# Patient Record
Sex: MALE | Race: WHITE | Employment: UNEMPLOYED | URBAN - METROPOLITAN AREA
[De-identification: names, ages, dates, MRNs, and addresses within clinical notes are randomized per-mention and may not be internally consistent; named-entity substitution may affect disease eponyms.]

---

## 2017-06-29 ENCOUNTER — OFFICE VISIT (OUTPATIENT)
Dept: FAMILY MEDICINE CLINIC | Age: 42
End: 2017-06-29

## 2017-06-29 VITALS
HEIGHT: 60 IN | BODY MASS INDEX: 28.35 KG/M2 | DIASTOLIC BLOOD PRESSURE: 80 MMHG | HEART RATE: 88 BPM | SYSTOLIC BLOOD PRESSURE: 120 MMHG | WEIGHT: 144.4 LBS

## 2017-06-29 DIAGNOSIS — F72 SEVERE INTELLECTUAL DISABILITY: Primary | ICD-10-CM

## 2017-06-29 DIAGNOSIS — F63.9 IMPULSE CONTROL DISORDER IN ADULT: ICD-10-CM

## 2017-06-29 DIAGNOSIS — R19.6 HALITOSIS: ICD-10-CM

## 2017-06-29 PROCEDURE — 1036F TOBACCO NON-USER: CPT | Performed by: FAMILY MEDICINE

## 2017-06-29 PROCEDURE — G8427 DOCREV CUR MEDS BY ELIG CLIN: HCPCS | Performed by: FAMILY MEDICINE

## 2017-06-29 PROCEDURE — 99213 OFFICE O/P EST LOW 20 MIN: CPT | Performed by: FAMILY MEDICINE

## 2017-06-29 PROCEDURE — G8419 CALC BMI OUT NRM PARAM NOF/U: HCPCS | Performed by: FAMILY MEDICINE

## 2017-12-19 ENCOUNTER — OFFICE VISIT (OUTPATIENT)
Dept: FAMILY MEDICINE CLINIC | Age: 42
End: 2017-12-19

## 2017-12-19 VITALS — DIASTOLIC BLOOD PRESSURE: 78 MMHG | SYSTOLIC BLOOD PRESSURE: 120 MMHG | TEMPERATURE: 97 F | RESPIRATION RATE: 12 BRPM

## 2017-12-19 DIAGNOSIS — R19.6 HALITOSIS: ICD-10-CM

## 2017-12-19 DIAGNOSIS — K06.9 CHRONIC GUM DISEASE: ICD-10-CM

## 2017-12-19 DIAGNOSIS — K12.2 INFECTION OF MOUTH: Primary | ICD-10-CM

## 2017-12-19 PROCEDURE — 99213 OFFICE O/P EST LOW 20 MIN: CPT | Performed by: FAMILY MEDICINE

## 2017-12-19 PROCEDURE — G8484 FLU IMMUNIZE NO ADMIN: HCPCS | Performed by: FAMILY MEDICINE

## 2017-12-19 PROCEDURE — G8427 DOCREV CUR MEDS BY ELIG CLIN: HCPCS | Performed by: FAMILY MEDICINE

## 2017-12-19 PROCEDURE — G8417 CALC BMI ABV UP PARAM F/U: HCPCS | Performed by: FAMILY MEDICINE

## 2017-12-19 PROCEDURE — 1036F TOBACCO NON-USER: CPT | Performed by: FAMILY MEDICINE

## 2017-12-19 PROCEDURE — 96372 THER/PROPH/DIAG INJ SC/IM: CPT | Performed by: FAMILY MEDICINE

## 2017-12-19 RX ORDER — DOXYCYCLINE HYCLATE 100 MG
100 TABLET ORAL 2 TIMES DAILY
Qty: 20 TABLET | Refills: 0 | Status: SHIPPED | OUTPATIENT
Start: 2017-12-19 | End: 2018-08-01 | Stop reason: SDUPTHER

## 2017-12-19 RX ORDER — CEFTRIAXONE 500 MG/1
500 INJECTION, POWDER, FOR SOLUTION INTRAMUSCULAR; INTRAVENOUS ONCE
Status: COMPLETED | OUTPATIENT
Start: 2017-12-19 | End: 2017-12-19

## 2017-12-19 RX ADMIN — CEFTRIAXONE 500 MG: 500 INJECTION, POWDER, FOR SOLUTION INTRAMUSCULAR; INTRAVENOUS at 14:50

## 2017-12-21 ENCOUNTER — OFFICE VISIT (OUTPATIENT)
Dept: FAMILY MEDICINE CLINIC | Age: 42
End: 2017-12-21

## 2017-12-21 VITALS — HEIGHT: 60 IN | DIASTOLIC BLOOD PRESSURE: 64 MMHG | SYSTOLIC BLOOD PRESSURE: 110 MMHG

## 2017-12-21 DIAGNOSIS — R32 URINARY INCONTINENCE, UNSPECIFIED TYPE: ICD-10-CM

## 2017-12-21 DIAGNOSIS — K59.00 CONSTIPATION, UNSPECIFIED CONSTIPATION TYPE: ICD-10-CM

## 2017-12-21 DIAGNOSIS — R15.9 INCONTINENCE OF FECES, UNSPECIFIED FECAL INCONTINENCE TYPE: ICD-10-CM

## 2017-12-21 DIAGNOSIS — F72 SEVERE INTELLECTUAL DISABILITY: ICD-10-CM

## 2017-12-21 DIAGNOSIS — L21.9 SEBORRHEA: ICD-10-CM

## 2017-12-21 DIAGNOSIS — K12.2 INFECTION OF MOUTH: ICD-10-CM

## 2017-12-21 DIAGNOSIS — E55.9 VITAMIN D DEFICIENCY: ICD-10-CM

## 2017-12-21 PROCEDURE — G8484 FLU IMMUNIZE NO ADMIN: HCPCS | Performed by: FAMILY MEDICINE

## 2017-12-21 PROCEDURE — G8428 CUR MEDS NOT DOCUMENT: HCPCS | Performed by: FAMILY MEDICINE

## 2017-12-21 PROCEDURE — 1036F TOBACCO NON-USER: CPT | Performed by: FAMILY MEDICINE

## 2017-12-21 PROCEDURE — G8417 CALC BMI ABV UP PARAM F/U: HCPCS | Performed by: FAMILY MEDICINE

## 2017-12-21 PROCEDURE — 99213 OFFICE O/P EST LOW 20 MIN: CPT | Performed by: FAMILY MEDICINE

## 2017-12-21 RX ORDER — INCONTINENCE PAD,LINER,DISP
EACH MISCELLANEOUS
Qty: 1 PACKAGE | Refills: 11 | Status: SHIPPED | OUTPATIENT
Start: 2017-12-21 | End: 2018-12-13 | Stop reason: SDUPTHER

## 2017-12-21 RX ORDER — DOCUSATE SODIUM 100 MG/1
100 CAPSULE, LIQUID FILLED ORAL NIGHTLY
Qty: 30 CAPSULE | Refills: 11 | Status: SHIPPED | OUTPATIENT
Start: 2017-12-21 | End: 2018-12-13 | Stop reason: SDUPTHER

## 2017-12-21 RX ORDER — SENNA PLUS 8.6 MG/1
2 TABLET ORAL DAILY
Qty: 60 TABLET | Refills: 11 | Status: SHIPPED | OUTPATIENT
Start: 2017-12-21 | End: 2018-12-13 | Stop reason: SDUPTHER

## 2017-12-21 RX ORDER — MULTIPLE VITAMINS W/ MINERALS TAB 9MG-400MCG
1 TAB ORAL DAILY
Qty: 30 TABLET | Refills: 11 | Status: SHIPPED | OUTPATIENT
Start: 2017-12-21 | End: 2019-09-20 | Stop reason: CLARIF

## 2017-12-21 RX ORDER — CHOLECALCIFEROL (VITAMIN D3) 50 MCG
2000 TABLET ORAL DAILY
Qty: 30 TABLET | Refills: 11 | Status: SHIPPED | OUTPATIENT
Start: 2017-12-21 | End: 2018-12-13 | Stop reason: SDUPTHER

## 2017-12-21 NOTE — LETTER
[address]  Phone: [phone]  Fax: [phone]    Rina Feliciano MD        December 21, 2017     Patient: Ashwin Hanson   YOB: 1975   Date of Visit: 12/21/2017       To Whom It May Concern: It is my medical opinion that Mara Navarrete may return to work on 12/22/2017. If you have any questions or concerns, please don't hesitate to call.     Sincerely,        Rina Feliciano MD

## 2018-08-01 ENCOUNTER — OFFICE VISIT (OUTPATIENT)
Dept: FAMILY MEDICINE CLINIC | Age: 43
End: 2018-08-01

## 2018-08-01 VITALS — SYSTOLIC BLOOD PRESSURE: 118 MMHG | TEMPERATURE: 98 F | DIASTOLIC BLOOD PRESSURE: 60 MMHG

## 2018-08-01 DIAGNOSIS — K06.8 BLEEDING GUMS: Primary | ICD-10-CM

## 2018-08-01 PROCEDURE — 1036F TOBACCO NON-USER: CPT | Performed by: FAMILY MEDICINE

## 2018-08-01 PROCEDURE — G8427 DOCREV CUR MEDS BY ELIG CLIN: HCPCS | Performed by: FAMILY MEDICINE

## 2018-08-01 PROCEDURE — 99213 OFFICE O/P EST LOW 20 MIN: CPT | Performed by: FAMILY MEDICINE

## 2018-08-01 PROCEDURE — G8421 BMI NOT CALCULATED: HCPCS | Performed by: FAMILY MEDICINE

## 2018-08-01 RX ORDER — BUSPIRONE HYDROCHLORIDE 15 MG/1
15 TABLET ORAL 2 TIMES DAILY
COMMUNITY

## 2018-08-01 RX ORDER — DOXYCYCLINE HYCLATE 100 MG
100 TABLET ORAL 2 TIMES DAILY
Qty: 14 TABLET | Refills: 0 | Status: SHIPPED | OUTPATIENT
Start: 2018-08-01 | End: 2018-08-08

## 2018-08-01 NOTE — LETTER
[address]  Phone: [phone]  Fax: [phone]    Susan Lawrence MD        August 1, 2018     Patient: Edgar Montanez   YOB: 1975   Date of Visit: 8/1/2018       To Whom it May Concern:    Anel Magana was seen in my clinic on 8/1/2018. He may return to work on 8/1/2018 without restrictions. .    If you have any questions or concerns, please don't hesitate to call.     Sincerely,         Susan Lawrence MD

## 2018-10-10 ENCOUNTER — HOSPITAL ENCOUNTER (EMERGENCY)
Age: 43
Discharge: HOME | End: 2018-10-10
Attending: EMERGENCY MEDICINE
Payer: MEDICARE

## 2018-10-10 VITALS
TEMPERATURE: 98.1 F | SYSTOLIC BLOOD PRESSURE: 122 MMHG | DIASTOLIC BLOOD PRESSURE: 93 MMHG | WEIGHT: 145 LBS | HEART RATE: 82 BPM | RESPIRATION RATE: 16 BRPM | HEIGHT: 62 IN | BODY MASS INDEX: 26.68 KG/M2 | OXYGEN SATURATION: 96 %

## 2018-10-10 DIAGNOSIS — R19.5 ABNORMAL STOOL COLOR: Primary | ICD-10-CM

## 2018-10-10 LAB
ALBUMIN SERPL-MCNC: 4 GM/DL (ref 3.4–5)
ALP BLD-CCNC: 64 IU/L (ref 40–129)
ALT SERPL-CCNC: 27 U/L (ref 10–40)
ANION GAP SERPL CALCULATED.3IONS-SCNC: 9 MMOL/L (ref 4–16)
AST SERPL-CCNC: 36 IU/L (ref 15–37)
BASOPHILS ABSOLUTE: 0 K/CU MM
BASOPHILS RELATIVE PERCENT: 0.5 % (ref 0–1)
BILIRUB SERPL-MCNC: 0.1 MG/DL (ref 0–1)
BUN BLDV-MCNC: 17 MG/DL (ref 6–23)
CALCIUM SERPL-MCNC: 9.4 MG/DL (ref 8.3–10.6)
CHLORIDE BLD-SCNC: 102 MMOL/L (ref 99–110)
CO2: 26 MMOL/L (ref 21–32)
CREAT SERPL-MCNC: 1.1 MG/DL (ref 0.9–1.3)
DIFFERENTIAL TYPE: ABNORMAL
EOSINOPHILS ABSOLUTE: 0.2 K/CU MM
EOSINOPHILS RELATIVE PERCENT: 3.2 % (ref 0–3)
GFR AFRICAN AMERICAN: >60 ML/MIN/1.73M2
GFR NON-AFRICAN AMERICAN: >60 ML/MIN/1.73M2
GLUCOSE BLD-MCNC: 103 MG/DL (ref 70–99)
HCT VFR BLD CALC: 42.2 % (ref 42–52)
HEMOGLOBIN: 13.9 GM/DL (ref 13.5–18)
IMMATURE NEUTROPHIL %: 0.2 % (ref 0–0.43)
INR BLD: 1.08 INDEX
LYMPHOCYTES ABSOLUTE: 2.3 K/CU MM
LYMPHOCYTES RELATIVE PERCENT: 40.5 % (ref 24–44)
MCH RBC QN AUTO: 31.8 PG (ref 27–31)
MCHC RBC AUTO-ENTMCNC: 32.9 % (ref 32–36)
MCV RBC AUTO: 96.6 FL (ref 78–100)
MONOCYTES ABSOLUTE: 0.5 K/CU MM
MONOCYTES RELATIVE PERCENT: 9.1 % (ref 0–4)
NUCLEATED RBC %: 0 %
PDW BLD-RTO: 12.6 % (ref 11.7–14.9)
PLATELET # BLD: 172 K/CU MM (ref 140–440)
PMV BLD AUTO: 10.2 FL (ref 7.5–11.1)
POTASSIUM SERPL-SCNC: 4.2 MMOL/L (ref 3.5–5.1)
PROTHROMBIN TIME: 12.3 SECONDS (ref 9.12–12.5)
RBC # BLD: 4.37 M/CU MM (ref 4.6–6.2)
SEGMENTED NEUTROPHILS ABSOLUTE COUNT: 2.6 K/CU MM
SEGMENTED NEUTROPHILS RELATIVE PERCENT: 46.5 % (ref 36–66)
SODIUM BLD-SCNC: 137 MMOL/L (ref 135–145)
TOTAL IMMATURE NEUTOROPHIL: 0.01 K/CU MM
TOTAL NUCLEATED RBC: 0 K/CU MM
TOTAL PROTEIN: 7.2 GM/DL (ref 6.4–8.2)
WBC # BLD: 5.6 K/CU MM (ref 4–10.5)

## 2018-10-10 PROCEDURE — 85025 COMPLETE CBC W/AUTO DIFF WBC: CPT

## 2018-10-10 PROCEDURE — 85610 PROTHROMBIN TIME: CPT

## 2018-10-10 PROCEDURE — 80053 COMPREHEN METABOLIC PANEL: CPT

## 2018-10-10 PROCEDURE — 99283 EMERGENCY DEPT VISIT LOW MDM: CPT

## 2018-10-10 NOTE — ED NOTES
Discharge instructions reviewed. Pt caregiver verbalized understanding.        [facility], RN  10/10/18 6921

## 2018-12-13 ENCOUNTER — OFFICE VISIT (OUTPATIENT)
Dept: FAMILY MEDICINE CLINIC | Age: 43
End: 2018-12-13
Payer: MEDICARE

## 2018-12-13 VITALS — BODY MASS INDEX: 27.44 KG/M2 | DIASTOLIC BLOOD PRESSURE: 80 MMHG | WEIGHT: 150 LBS | SYSTOLIC BLOOD PRESSURE: 110 MMHG

## 2018-12-13 DIAGNOSIS — K59.00 CONSTIPATION, UNSPECIFIED CONSTIPATION TYPE: ICD-10-CM

## 2018-12-13 DIAGNOSIS — L22 DIAPER RASH: Primary | ICD-10-CM

## 2018-12-13 DIAGNOSIS — Z23 NEED FOR TETANUS BOOSTER: ICD-10-CM

## 2018-12-13 DIAGNOSIS — R32 URINARY INCONTINENCE, UNSPECIFIED TYPE: ICD-10-CM

## 2018-12-13 DIAGNOSIS — R15.9 INCONTINENCE OF FECES, UNSPECIFIED FECAL INCONTINENCE TYPE: ICD-10-CM

## 2018-12-13 DIAGNOSIS — L21.9 SEBORRHEA: ICD-10-CM

## 2018-12-13 DIAGNOSIS — F72 SEVERE INTELLECTUAL DISABILITY: ICD-10-CM

## 2018-12-13 DIAGNOSIS — E55.9 VITAMIN D DEFICIENCY: ICD-10-CM

## 2018-12-13 PROCEDURE — G8417 CALC BMI ABV UP PARAM F/U: HCPCS | Performed by: FAMILY MEDICINE

## 2018-12-13 PROCEDURE — G8427 DOCREV CUR MEDS BY ELIG CLIN: HCPCS | Performed by: FAMILY MEDICINE

## 2018-12-13 PROCEDURE — G8484 FLU IMMUNIZE NO ADMIN: HCPCS | Performed by: FAMILY MEDICINE

## 2018-12-13 PROCEDURE — 99214 OFFICE O/P EST MOD 30 MIN: CPT | Performed by: FAMILY MEDICINE

## 2018-12-13 PROCEDURE — 90471 IMMUNIZATION ADMIN: CPT | Performed by: FAMILY MEDICINE

## 2018-12-13 PROCEDURE — 90715 TDAP VACCINE 7 YRS/> IM: CPT | Performed by: FAMILY MEDICINE

## 2018-12-13 PROCEDURE — 1036F TOBACCO NON-USER: CPT | Performed by: FAMILY MEDICINE

## 2018-12-13 RX ORDER — SENNA PLUS 8.6 MG/1
2 TABLET ORAL DAILY
Qty: 60 TABLET | Refills: 11 | Status: SHIPPED | OUTPATIENT
Start: 2018-12-13 | End: 2019-12-16 | Stop reason: SDUPTHER

## 2018-12-13 RX ORDER — DOCUSATE SODIUM 100 MG/1
100 CAPSULE, LIQUID FILLED ORAL NIGHTLY
Qty: 30 CAPSULE | Refills: 11 | Status: SHIPPED | OUTPATIENT
Start: 2018-12-13 | End: 2019-12-16 | Stop reason: SDUPTHER

## 2018-12-13 RX ORDER — CHOLECALCIFEROL (VITAMIN D3) 50 MCG
2000 TABLET ORAL DAILY
Qty: 30 TABLET | Refills: 11 | Status: SHIPPED | OUTPATIENT
Start: 2018-12-13 | End: 2019-09-23

## 2018-12-13 RX ORDER — INCONTINENCE PAD,LINER,DISP
EACH MISCELLANEOUS
Qty: 1 PACKAGE | Refills: 11 | Status: SHIPPED | OUTPATIENT
Start: 2018-12-13 | End: 2019-12-16 | Stop reason: SDUPTHER

## 2018-12-13 RX ORDER — NYSTATIN 100000 [USP'U]/G
POWDER TOPICAL
Qty: 1 BOTTLE | Refills: 11 | Status: SHIPPED | OUTPATIENT
Start: 2018-12-13 | End: 2019-12-16 | Stop reason: SDUPTHER

## 2019-09-04 ENCOUNTER — CARE COORDINATION (OUTPATIENT)
Dept: CARE COORDINATION | Age: 44
End: 2019-09-04

## 2019-09-17 ENCOUNTER — CARE COORDINATION (OUTPATIENT)
Dept: CARE COORDINATION | Age: 44
End: 2019-09-17

## 2019-09-20 ENCOUNTER — OFFICE VISIT (OUTPATIENT)
Dept: FAMILY MEDICINE CLINIC | Age: 44
End: 2019-09-20
Payer: MEDICARE

## 2019-09-20 VITALS
RESPIRATION RATE: 22 BRPM | HEIGHT: 60 IN | DIASTOLIC BLOOD PRESSURE: 81 MMHG | BODY MASS INDEX: 29.21 KG/M2 | WEIGHT: 148.8 LBS | SYSTOLIC BLOOD PRESSURE: 125 MMHG | HEART RATE: 62 BPM

## 2019-09-20 DIAGNOSIS — R19.6 BAD BREATH: ICD-10-CM

## 2019-09-20 DIAGNOSIS — K08.9 POOR DENTITION: ICD-10-CM

## 2019-09-20 DIAGNOSIS — Z01.818 PRE-OP EXAMINATION: Primary | ICD-10-CM

## 2019-09-20 DIAGNOSIS — F72 SEVERE INTELLECTUAL DISABILITY: ICD-10-CM

## 2019-09-20 PROCEDURE — G8427 DOCREV CUR MEDS BY ELIG CLIN: HCPCS | Performed by: FAMILY MEDICINE

## 2019-09-20 PROCEDURE — G8417 CALC BMI ABV UP PARAM F/U: HCPCS | Performed by: FAMILY MEDICINE

## 2019-09-20 PROCEDURE — 99212 OFFICE O/P EST SF 10 MIN: CPT | Performed by: FAMILY MEDICINE

## 2019-09-20 RX ORDER — MULTIVIT-MIN/IRON/FOLIC ACID/K 18-600-40
CAPSULE ORAL
COMMUNITY
End: 2021-12-06

## 2019-09-20 ASSESSMENT — PATIENT HEALTH QUESTIONNAIRE - PHQ9
2. FEELING DOWN, DEPRESSED OR HOPELESS: 0
1. LITTLE INTEREST OR PLEASURE IN DOING THINGS: 0
DEPRESSION UNABLE TO ASSESS: FUNCTIONAL CAPACITY MOTIVATION LIMITS ACCURACY
SUM OF ALL RESPONSES TO PHQ QUESTIONS 1-9: 0
SUM OF ALL RESPONSES TO PHQ QUESTIONS 1-9: 0
SUM OF ALL RESPONSES TO PHQ9 QUESTIONS 1 & 2: 0

## 2019-09-20 NOTE — PROGRESS NOTES
Que Kilpatrick  1975 09/20/19    HPI:    Here for preop clearance for dental cleaning. has had previous surgery. has not had past problemswith anesthesia. has not had unusual bleeding or bruising. has not had previous surgical infection. Knows to stop NSAIDS and smoking (if applicable) at least 1 week priorto surgery. Unable to do review of systems due to severe intellectual disability. Patient Active Problem List   Diagnosis    Vitamin D deficiency    Severe intellectual disability    Constipation    Neuropathy    Urinary incontinence    Impulse control disorder in adult    Seborrhea    Halitosis    Charcot's syndrome ([address]    Chronic gum disease    Stool incontinence       Past Medical History:   Diagnosis Date    Constipation     Dandruff in adult     H/O mental retardation     pt is \"only verbal alittle bit- able to say yes but not always correct\",ambulatory, feeds and dresses self with assist    HX OTHER MEDICAL     old chart gives hx of Charciots Syndrome with associated neuropathy    Hyperlipidemia     Impulse disorder     Periodontal disease     Urinary incontinence     wears depends( urine and bowel incont)         Past Surgical History:   Procedure Laterality Date    DENTAL SURGERY      dental restoration on 3/2010 and 3/2011   [facility]  9/13/2013    Dental restorations, 2 teeth extractions    DENTAL SURGERY Bilateral [id number]    restorations       Current Outpatient Medications on File Prior to Visit   Medication Sig Dispense Refill    Cholecalciferol (VITAMIN D) 2000 units CAPS capsule Take by mouth      senna (SENOKOT) 8.6 MG tablet Take 2 tablets by mouth daily 60 tablet 11    Disposable Gloves (NITRILE GLOVES LARGE) MISC Use as directed 100 each 11    coal tar (MAYITO-GEL TAR SHAMPOO) 0.5 % shampoo Apply topically every other day at bath time.  1 Bottle 11    Incontinence Supply Disposable (DEPEND UNDERGARMENT EX ABSORB) MISC Use as needed for

## 2019-09-26 ENCOUNTER — ANESTHESIA EVENT (OUTPATIENT)
Dept: OPERATING ROOM | Age: 44
End: 2019-09-26
Payer: MEDICARE

## 2019-09-26 ASSESSMENT — LIFESTYLE VARIABLES: SMOKING_STATUS: 0

## 2019-09-26 NOTE — ANESTHESIA PRE PROCEDURE
incontinence-Medium size 1 Package 11    docusate sodium (COLACE) 100 MG capsule Take 1 capsule by mouth nightly 30 capsule 11    nystatin (MYCOSTATIN) 780083 UNIT/GM powder 2 x day for 2 weeks per episode 1 Bottle 11    busPIRone (BUSPAR) 15 MG tablet Take 15 mg by mouth 2 times daily      citalopram (CELEXA) 40 MG tablet Take 1 tablet by mouth daily 30 tablet 5       Allergies: Allergies   Allergen Reactions    Other      Per provider- allergic to Parish-cobefrin- they are unsure of the reaction with this       Problem List:    Patient Active Problem List   Diagnosis Code    Vitamin D deficiency E55.9    Severe intellectual disability F72    Constipation K59.00    Neuropathy G62.9    Urinary incontinence R32    Impulse control disorder in adult F63.9    Seborrhea L21.9    Halitosis R19.6    Charcot's syndrome ([address] I73.9    Chronic gum disease K06.9    Stool incontinence R15.9       Past Medical History:        Diagnosis Date    Constipation     Dandruff in adult     H/O mental retardation      per caregiver \" he is pretty much non-verbal just makes some noises\",ambulatory, feeds and dresses self with assist\"    HX OTHER MEDICAL     old chart gives hx of Charciots Syndrome with associated neuropathy    Hyperlipidemia     Impulse disorder     Periodontal disease     Urinary incontinence     wears depends( urine and bowel incont)       Past Surgical History:        Procedure Laterality Date    DENTAL SURGERY      dental restoration on 3/2010 and 3/2011   [facility] DENTAL SURGERY  9/13/2013    Dental restorations, 2 teeth extractions    DENTAL SURGERY Bilateral [id number]    restorations       Social History:    Social History     Tobacco Use    Smoking status: Never Smoker    Smokeless tobacco: Never Used    Tobacco comment: provider has no family hx   Substance Use Topics    Alcohol use:  No                                Counseling given: Not Answered  Comment: provider has no family hx      Vital Signs (Current):   Vitals:    09/23/19 1324   Weight: 149 lb (67.6 kg)   Height: 5' (1.524 m)                                              BP Readings from Last 3 Encounters:   09/20/19 125/81   12/13/18 110/80   10/10/18 (!) 122/93       NPO Status:                                                                                 BMI:   Wt Readings from Last 3 Encounters:   09/20/19 148 lb 12.8 oz (67.5 kg)   12/13/18 150 lb (68 kg)   10/10/18 145 lb (65.8 kg)     Body mass index is 29.1 kg/m². CBC:   Lab Results   Component Value Date    WBC 5.6 10/10/2018    RBC 4.37 10/10/2018    HGB 13.9 10/10/2018    HCT 42.2 10/10/2018    MCV 96.6 10/10/2018    RDW 12.6 10/10/2018     10/10/2018       CMP:   Lab Results   Component Value Date     10/10/2018    K 4.2 10/10/2018     10/10/2018    CO2 26 10/10/2018    BUN 17 10/10/2018    CREATININE 1.1 10/10/2018    GFRAA >60 10/10/2018    AGRATIO 1.6 06/13/2016    LABGLOM >60 10/10/2018    GLUCOSE 103 10/10/2018    PROT 7.2 10/10/2018    PROT 7.2 03/24/2011    CALCIUM 9.4 10/10/2018    BILITOT 0.1 10/10/2018    ALKPHOS 64 10/10/2018    AST 36 10/10/2018    ALT 27 10/10/2018       POC Tests: No results for input(s): POCGLU, POCNA, POCK, POCCL, POCBUN, POCHEMO, POCHCT in the last 72 hours.     Coags:   Lab Results   Component Value Date    PROTIME 12.3 10/10/2018    INR 1.08 10/10/2018       HCG (If Applicable): No results found for: PREGTESTUR, PREGSERUM, HCG, HCGQUANT     ABGs: No results found for: PHART, PO2ART, OKU6GUS, JHO2PTU, BEART, L1TTMJGM     Type & Screen (If Applicable):  No results found for: LABABO, [address]    Anesthesia Evaluation  Patient summary reviewed and Nursing notes reviewed  Airway: Mallampati: III  TM distance: <3 FB   Neck ROM: limited  Mouth opening: > = 3 FB Dental:      Comment: NONVERBAL , MRDD    Pulmonary:Negative Pulmonary ROS and normal exam        (-) not a current smoker

## 2019-09-27 ENCOUNTER — ANESTHESIA (OUTPATIENT)
Dept: OPERATING ROOM | Age: 44
End: 2019-09-27
Payer: MEDICARE

## 2019-09-27 ENCOUNTER — HOSPITAL ENCOUNTER (OUTPATIENT)
Age: 44
Setting detail: OUTPATIENT SURGERY
Discharge: HOME | End: 2019-09-27
Attending: DENTIST | Admitting: DENTIST
Payer: MEDICARE

## 2019-09-27 VITALS
OXYGEN SATURATION: 95 % | TEMPERATURE: 98.6 F | DIASTOLIC BLOOD PRESSURE: 75 MMHG | RESPIRATION RATE: 12 BRPM | SYSTOLIC BLOOD PRESSURE: 116 MMHG

## 2019-09-27 VITALS
DIASTOLIC BLOOD PRESSURE: 72 MMHG | HEART RATE: 76 BPM | WEIGHT: 149 LBS | SYSTOLIC BLOOD PRESSURE: 103 MMHG | TEMPERATURE: 97.8 F | BODY MASS INDEX: 29.25 KG/M2 | OXYGEN SATURATION: 99 % | RESPIRATION RATE: 16 BRPM | HEIGHT: 60 IN

## 2019-09-27 PROBLEM — K08.129: Status: RESOLVED | Noted: 2019-09-27 | Resolved: 2019-09-27

## 2019-09-27 LAB
ANION GAP SERPL CALCULATED.3IONS-SCNC: 6 MMOL/L (ref 4–16)
BUN BLDV-MCNC: 17 MG/DL (ref 6–23)
CALCIUM SERPL-MCNC: 9 MG/DL (ref 8.3–10.6)
CHLORIDE BLD-SCNC: 103 MMOL/L (ref 99–110)
CO2: 30 MMOL/L (ref 21–32)
CREAT SERPL-MCNC: 1 MG/DL (ref 0.9–1.3)
GFR AFRICAN AMERICAN: >60 ML/MIN/1.73M2
GFR NON-AFRICAN AMERICAN: >60 ML/MIN/1.73M2
GLUCOSE BLD-MCNC: 89 MG/DL (ref 70–99)
HCT VFR BLD CALC: 44.4 % (ref 42–52)
HEMOGLOBIN: 14.4 GM/DL (ref 13.5–18)
MCH RBC QN AUTO: 30.8 PG (ref 27–31)
MCHC RBC AUTO-ENTMCNC: 32.4 % (ref 32–36)
MCV RBC AUTO: 94.9 FL (ref 78–100)
PDW BLD-RTO: 12.6 % (ref 11.7–14.9)
PLATELET # BLD: 174 K/CU MM (ref 140–440)
PMV BLD AUTO: 10.5 FL (ref 7.5–11.1)
POTASSIUM SERPL-SCNC: 4.5 MMOL/L (ref 3.5–5.1)
RBC # BLD: 4.68 M/CU MM (ref 4.6–6.2)
SODIUM BLD-SCNC: 139 MMOL/L (ref 135–145)
WBC # BLD: 5.1 K/CU MM (ref 4–10.5)

## 2019-09-27 PROCEDURE — 7100000000 HC PACU RECOVERY - FIRST 15 MIN: Performed by: DENTIST

## 2019-09-27 PROCEDURE — 2500000003 HC RX 250 WO HCPCS: Performed by: NURSE ANESTHETIST, CERTIFIED REGISTERED

## 2019-09-27 PROCEDURE — 7100000001 HC PACU RECOVERY - ADDTL 15 MIN: Performed by: DENTIST

## 2019-09-27 PROCEDURE — 85027 COMPLETE CBC AUTOMATED: CPT

## 2019-09-27 PROCEDURE — 6360000002 HC RX W HCPCS: Performed by: NURSE ANESTHETIST, CERTIFIED REGISTERED

## 2019-09-27 PROCEDURE — 7100000011 HC PHASE II RECOVERY - ADDTL 15 MIN: Performed by: DENTIST

## 2019-09-27 PROCEDURE — 2709999900 HC NON-CHARGEABLE SUPPLY: Performed by: DENTIST

## 2019-09-27 PROCEDURE — 3600000002 HC SURGERY LEVEL 2 BASE: Performed by: DENTIST

## 2019-09-27 PROCEDURE — 3700000000 HC ANESTHESIA ATTENDED CARE: Performed by: DENTIST

## 2019-09-27 PROCEDURE — 7100000010 HC PHASE II RECOVERY - FIRST 15 MIN: Performed by: DENTIST

## 2019-09-27 PROCEDURE — 3700000001 HC ADD 15 MINUTES (ANESTHESIA): Performed by: DENTIST

## 2019-09-27 PROCEDURE — 3600000012 HC SURGERY LEVEL 2 ADDTL 15MIN: Performed by: DENTIST

## 2019-09-27 PROCEDURE — 2580000003 HC RX 258: Performed by: NURSE ANESTHETIST, CERTIFIED REGISTERED

## 2019-09-27 PROCEDURE — 2580000003 HC RX 258

## 2019-09-27 PROCEDURE — 80048 BASIC METABOLIC PNL TOTAL CA: CPT

## 2019-09-27 RX ORDER — HYDRALAZINE HYDROCHLORIDE 20 MG/ML
5 INJECTION INTRAMUSCULAR; INTRAVENOUS EVERY 10 MIN PRN
Status: DISCONTINUED | OUTPATIENT
Start: 2019-09-27 | End: 2019-09-27 | Stop reason: HOSPADM

## 2019-09-27 RX ORDER — FENTANYL CITRATE 50 UG/ML
25 INJECTION, SOLUTION INTRAMUSCULAR; INTRAVENOUS EVERY 5 MIN PRN
Status: DISCONTINUED | OUTPATIENT
Start: 2019-09-27 | End: 2019-09-27 | Stop reason: HOSPADM

## 2019-09-27 RX ORDER — PROPOFOL 10 MG/ML
INJECTION, EMULSION INTRAVENOUS PRN
Status: DISCONTINUED | OUTPATIENT
Start: 2019-09-27 | End: 2019-09-27 | Stop reason: SDUPTHER

## 2019-09-27 RX ORDER — SODIUM CHLORIDE, SODIUM LACTATE, POTASSIUM CHLORIDE, CALCIUM CHLORIDE 600; 310; 30; 20 MG/100ML; MG/100ML; MG/100ML; MG/100ML
INJECTION, SOLUTION INTRAVENOUS CONTINUOUS PRN
Status: DISCONTINUED | OUTPATIENT
Start: 2019-09-27 | End: 2019-09-27 | Stop reason: SDUPTHER

## 2019-09-27 RX ORDER — SODIUM CHLORIDE, SODIUM LACTATE, POTASSIUM CHLORIDE, CALCIUM CHLORIDE 600; 310; 30; 20 MG/100ML; MG/100ML; MG/100ML; MG/100ML
INJECTION, SOLUTION INTRAVENOUS
Status: COMPLETED
Start: 2019-09-27 | End: 2019-09-27

## 2019-09-27 RX ORDER — LIDOCAINE HYDROCHLORIDE 20 MG/ML
INJECTION, SOLUTION INTRAVENOUS PRN
Status: DISCONTINUED | OUTPATIENT
Start: 2019-09-27 | End: 2019-09-27 | Stop reason: SDUPTHER

## 2019-09-27 RX ORDER — FENTANYL CITRATE 50 UG/ML
50 INJECTION, SOLUTION INTRAMUSCULAR; INTRAVENOUS EVERY 5 MIN PRN
Status: DISCONTINUED | OUTPATIENT
Start: 2019-09-27 | End: 2019-09-27 | Stop reason: HOSPADM

## 2019-09-27 RX ORDER — MIDAZOLAM HYDROCHLORIDE 1 MG/ML
INJECTION INTRAMUSCULAR; INTRAVENOUS PRN
Status: DISCONTINUED | OUTPATIENT
Start: 2019-09-27 | End: 2019-09-27 | Stop reason: SDUPTHER

## 2019-09-27 RX ORDER — ONDANSETRON 2 MG/ML
INJECTION INTRAMUSCULAR; INTRAVENOUS PRN
Status: DISCONTINUED | OUTPATIENT
Start: 2019-09-27 | End: 2019-09-27 | Stop reason: SDUPTHER

## 2019-09-27 RX ORDER — ROCURONIUM BROMIDE 10 MG/ML
INJECTION, SOLUTION INTRAVENOUS PRN
Status: DISCONTINUED | OUTPATIENT
Start: 2019-09-27 | End: 2019-09-27 | Stop reason: SDUPTHER

## 2019-09-27 RX ORDER — DEXAMETHASONE SODIUM PHOSPHATE 4 MG/ML
INJECTION, SOLUTION INTRA-ARTICULAR; INTRALESIONAL; INTRAMUSCULAR; INTRAVENOUS; SOFT TISSUE PRN
Status: DISCONTINUED | OUTPATIENT
Start: 2019-09-27 | End: 2019-09-27 | Stop reason: SDUPTHER

## 2019-09-27 RX ORDER — LABETALOL 20 MG/4 ML (5 MG/ML) INTRAVENOUS SYRINGE
5 EVERY 10 MIN PRN
Status: DISCONTINUED | OUTPATIENT
Start: 2019-09-27 | End: 2019-09-27 | Stop reason: HOSPADM

## 2019-09-27 RX ORDER — SODIUM CHLORIDE, SODIUM LACTATE, POTASSIUM CHLORIDE, CALCIUM CHLORIDE 600; 310; 30; 20 MG/100ML; MG/100ML; MG/100ML; MG/100ML
INJECTION, SOLUTION INTRAVENOUS ONCE
Status: COMPLETED | OUTPATIENT
Start: 2019-09-27 | End: 2019-09-27

## 2019-09-27 RX ORDER — KETOROLAC TROMETHAMINE 30 MG/ML
INJECTION, SOLUTION INTRAMUSCULAR; INTRAVENOUS PRN
Status: DISCONTINUED | OUTPATIENT
Start: 2019-09-27 | End: 2019-09-27 | Stop reason: SDUPTHER

## 2019-09-27 RX ADMIN — KETOROLAC TROMETHAMINE 30 MG: 30 INJECTION, SOLUTION INTRAMUSCULAR; INTRAVENOUS at 13:12

## 2019-09-27 RX ADMIN — ROCURONIUM BROMIDE 40 MG: 10 INJECTION INTRAVENOUS at 12:27

## 2019-09-27 RX ADMIN — PROPOFOL 5 MG: 10 INJECTION, EMULSION INTRAVENOUS at 12:26

## 2019-09-27 RX ADMIN — DEXAMETHASONE SODIUM PHOSPHATE 12 MG: 4 INJECTION, SOLUTION INTRAMUSCULAR; INTRAVENOUS at 12:34

## 2019-09-27 RX ADMIN — SUGAMMADEX 200 MG: 100 INJECTION, SOLUTION INTRAVENOUS at 13:13

## 2019-09-27 RX ADMIN — SODIUM CHLORIDE, POTASSIUM CHLORIDE, SODIUM LACTATE AND CALCIUM CHLORIDE: 600; 310; 30; 20 INJECTION, SOLUTION INTRAVENOUS at 12:10

## 2019-09-27 RX ADMIN — MIDAZOLAM HYDROCHLORIDE 2 MG: 1 INJECTION, SOLUTION INTRAMUSCULAR; INTRAVENOUS at 12:05

## 2019-09-27 RX ADMIN — SODIUM CHLORIDE, POTASSIUM CHLORIDE, SODIUM LACTATE AND CALCIUM CHLORIDE 1000 ML: 600; 310; 30; 20 INJECTION, SOLUTION INTRAVENOUS at 10:42

## 2019-09-27 RX ADMIN — SODIUM CHLORIDE, SODIUM LACTATE, POTASSIUM CHLORIDE, CALCIUM CHLORIDE 1000 ML: 600; 310; 30; 20 INJECTION, SOLUTION INTRAVENOUS at 10:42

## 2019-09-27 RX ADMIN — LIDOCAINE HYDROCHLORIDE 100 MG: 20 INJECTION, SOLUTION INTRAVENOUS at 12:26

## 2019-09-27 RX ADMIN — ONDANSETRON 4 MG: 2 INJECTION INTRAMUSCULAR; INTRAVENOUS at 12:34

## 2019-09-27 ASSESSMENT — PULMONARY FUNCTION TESTS
PIF_VALUE: 21
PIF_VALUE: 12
PIF_VALUE: 27
PIF_VALUE: 12
PIF_VALUE: 27
PIF_VALUE: 12
PIF_VALUE: 12
PIF_VALUE: 30
PIF_VALUE: 12
PIF_VALUE: 29
PIF_VALUE: 4
PIF_VALUE: 6
PIF_VALUE: 11
PIF_VALUE: 7
PIF_VALUE: 12
PIF_VALUE: 15
PIF_VALUE: 13
PIF_VALUE: 4
PIF_VALUE: 12
PIF_VALUE: 29
PIF_VALUE: 24
PIF_VALUE: 0
PIF_VALUE: 12
PIF_VALUE: 12
PIF_VALUE: 4
PIF_VALUE: 12
PIF_VALUE: 13
PIF_VALUE: 1
PIF_VALUE: 0
PIF_VALUE: 12
PIF_VALUE: 27
PIF_VALUE: 17
PIF_VALUE: 12
PIF_VALUE: 28
PIF_VALUE: 4
PIF_VALUE: 12
PIF_VALUE: 14
PIF_VALUE: 12
PIF_VALUE: 12
PIF_VALUE: 11
PIF_VALUE: 0
PIF_VALUE: 12
PIF_VALUE: 12
PIF_VALUE: 1
PIF_VALUE: 12
PIF_VALUE: 2
PIF_VALUE: 12
PIF_VALUE: 0
PIF_VALUE: 0
PIF_VALUE: 11
PIF_VALUE: 12
PIF_VALUE: 0
PIF_VALUE: 12
PIF_VALUE: 15
PIF_VALUE: 12
PIF_VALUE: 12
PIF_VALUE: 6
PIF_VALUE: 11
PIF_VALUE: 12
PIF_VALUE: 11
PIF_VALUE: 12
PIF_VALUE: 12
PIF_VALUE: 0
PIF_VALUE: 12
PIF_VALUE: 18
PIF_VALUE: 12
PIF_VALUE: 12
PIF_VALUE: 16
PIF_VALUE: 12
PIF_VALUE: 12
PIF_VALUE: 16

## 2019-09-27 ASSESSMENT — PAIN SCALES - WONG BAKER
WONGBAKER_NUMERICALRESPONSE: 0
WONGBAKER_NUMERICALRESPONSE: 2

## 2019-09-27 ASSESSMENT — PAIN DESCRIPTION - PAIN TYPE: TYPE: SURGICAL PAIN

## 2019-09-27 NOTE — PROGRESS NOTES
Pt incontinent of urine, assisted caregiver with pericare, pt dressed and ready for discharge. 1550 discharge instructions given to caregiver, voiced understanding. 1600 escorted to car with caregiver per wheelchair.

## 2019-09-27 NOTE — LETTER
[address]  Phone: [phone]    No name on file. September 27, 2019     Patient: Jayjay Segovia   YOB: 1975   Date of Visit: 8/22/2019       To Whom It May Concern:     Meron Carlos was here for SDS on 9/27/19 Dr Ramon Orr stated that he may return to work on 9/30/19. If you have any questions or concerns, please don't hesitate to call.     Sincerely,        Tray Montes RN

## 2019-09-27 NOTE — OP NOTE
Pre op diagnosis: Dental Caries periodontal disease mental retardation    Post op diagnosis: Dental Caries periodontal disease mental retardation    Proposed operation: All necessary dental treatment    Anesthesia: General    Indications: Patient suffers from excessive dental caries and mental retardation which leads patient to act uncooperatively  In the conventional setting thus requiring outpatient surgery. The scope of the treatment plan was explained to the patients caregiver, they agree and understand the procedure. Description of the procedure:  Patient was taken to the operating room. Placed in the supine position on the operating room table. General anesthesia and a nasotracheal tube placed. Patient was then prepped and draped in the usual fashion for intraoral surgery and pharyngeal pack placed. X-rays were taken of maxillary and mandibular arches. X-ray reveal extensive periodontal disease. .  Clinical exam reveals extensive periodontal disease. Head, neck, pharynx, floor of the mouth had no remarkable findings. The teeth were then scaled and polished using ultrasonics and hand instrumentation. The teeth were then extrated due to class 3 plus #3,13,15,19,20,23,24,25,26,28,30 . The occlusion was evaluated, the oral cavity was rinsed and inspected. The pharyngeal pack was removed. Patient was awake and vital signs were noted. Patient was transferred to the recovery room in stable condition. No complications were noted throughout the procedure. EBL:200cc  Post op instructions:  Patient is to receive regular recovery room procedures and is to be discharged when awake and stable. Patient will be followed up in office as needed.

## 2019-09-27 NOTE — LETTER
[address]  Phone: [phone]    No name on file. September 27, 2019     Patient: Kenneth Lopez   YOB: 1975   Date of Visit: 9/27/2019       To Whom it May Concern:    Kerri Hanley was here for SDS on 9/27/19, Dr Agustina Conley stated that he may return to work on 9/30/19. If you have any questions or concerns, please don't hesitate to call.     Sincerely,         Joel Arriola RN

## 2019-09-27 NOTE — PROGRESS NOTES
65-pt rec'd from the OR and placed on pacu monitor with alarms on. Report rec'd from DONAVAN, Chelly Drape and OR nurse. Pt arouses, opens eyes, and tries to pull off simple mask. pt is non-verbal.  Oral gauze intact with some bloody drainage present. resps even and unlabored. No facial grimacing noted. 1340- pt pulled gauze from his mouth. Small amount bloody drainage noted from mouth. Area dabbed with cool washcloth. Repositioned on right side. 1401- pt calm and appears to be without distress. VSS. No facial grimacing present. Pt transferred back to [facility] via cart without incident. Handoff report given.

## 2019-09-27 NOTE — LETTER
[address] [address]  Phone: [phone]        September 27, 2019     Patient: Bhavin Henderson   YOB: 1975   Date of Visit: 9/27/2019       To Whom It May Concern:     Elvia Morales here for [facility] on 9/27/19 may return to work on 9/30/2019 without any restrictions. If you have any questions or concerns, please don't hesitate to call.     Sincerely,          Ata Elmore RN

## 2019-12-16 ENCOUNTER — OFFICE VISIT (OUTPATIENT)
Dept: FAMILY MEDICINE CLINIC | Age: 44
End: 2019-12-16
Payer: MEDICARE

## 2019-12-16 VITALS
SYSTOLIC BLOOD PRESSURE: 110 MMHG | HEART RATE: 77 BPM | DIASTOLIC BLOOD PRESSURE: 70 MMHG | OXYGEN SATURATION: 93 % | BODY MASS INDEX: 28.47 KG/M2 | WEIGHT: 145.8 LBS

## 2019-12-16 DIAGNOSIS — F72 SEVERE INTELLECTUAL DISABILITY: ICD-10-CM

## 2019-12-16 DIAGNOSIS — E55.9 VITAMIN D DEFICIENCY: ICD-10-CM

## 2019-12-16 DIAGNOSIS — R32 URINARY INCONTINENCE, UNSPECIFIED TYPE: ICD-10-CM

## 2019-12-16 DIAGNOSIS — R15.9 INCONTINENCE OF FECES, UNSPECIFIED FECAL INCONTINENCE TYPE: ICD-10-CM

## 2019-12-16 DIAGNOSIS — L22 DIAPER RASH: ICD-10-CM

## 2019-12-16 DIAGNOSIS — L21.9 SEBORRHEA: ICD-10-CM

## 2019-12-16 DIAGNOSIS — K59.00 CONSTIPATION, UNSPECIFIED CONSTIPATION TYPE: ICD-10-CM

## 2019-12-16 PROCEDURE — 99214 OFFICE O/P EST MOD 30 MIN: CPT | Performed by: FAMILY MEDICINE

## 2019-12-16 PROCEDURE — G8427 DOCREV CUR MEDS BY ELIG CLIN: HCPCS | Performed by: FAMILY MEDICINE

## 2019-12-16 PROCEDURE — G8417 CALC BMI ABV UP PARAM F/U: HCPCS | Performed by: FAMILY MEDICINE

## 2019-12-16 PROCEDURE — G8484 FLU IMMUNIZE NO ADMIN: HCPCS | Performed by: FAMILY MEDICINE

## 2019-12-16 PROCEDURE — 1036F TOBACCO NON-USER: CPT | Performed by: FAMILY MEDICINE

## 2019-12-16 RX ORDER — INCONTINENCE PAD,LINER,DISP
EACH MISCELLANEOUS
Qty: 1 PACKAGE | Refills: 11 | Status: SHIPPED | OUTPATIENT
Start: 2019-12-16 | End: 2020-12-07 | Stop reason: SDUPTHER

## 2019-12-16 RX ORDER — DOCUSATE SODIUM 100 MG/1
100 CAPSULE, LIQUID FILLED ORAL NIGHTLY
Qty: 30 CAPSULE | Refills: 11 | Status: SHIPPED | OUTPATIENT
Start: 2019-12-16 | End: 2020-12-07 | Stop reason: SDUPTHER

## 2019-12-16 RX ORDER — NYSTATIN 100000 [USP'U]/G
POWDER TOPICAL
Qty: 1 BOTTLE | Refills: 11 | Status: SHIPPED | OUTPATIENT
Start: 2019-12-16 | End: 2020-12-07 | Stop reason: SDUPTHER

## 2019-12-16 RX ORDER — SENNA PLUS 8.6 MG/1
2 TABLET ORAL DAILY
Qty: 60 TABLET | Refills: 11 | Status: SHIPPED | OUTPATIENT
Start: 2019-12-16 | End: 2020-12-07 | Stop reason: SDUPTHER

## 2019-12-16 RX ORDER — CHOLECALCIFEROL (VITAMIN D3) 50 MCG
2000 TABLET ORAL DAILY
Qty: 30 TABLET | Refills: 11 | Status: SHIPPED | OUTPATIENT
Start: 2019-12-16 | End: 2020-12-07 | Stop reason: SDUPTHER

## 2020-09-30 ENCOUNTER — IMMUNIZATION (OUTPATIENT)
Dept: FAMILY MEDICINE CLINIC | Age: 45
End: 2020-09-30
Payer: MEDICARE

## 2020-09-30 PROCEDURE — G0008 ADMIN INFLUENZA VIRUS VAC: HCPCS | Performed by: FAMILY MEDICINE

## 2020-09-30 PROCEDURE — 90686 IIV4 VACC NO PRSV 0.5 ML IM: CPT | Performed by: FAMILY MEDICINE

## 2020-11-11 ENCOUNTER — TELEPHONE (OUTPATIENT)
Dept: FAMILY MEDICINE CLINIC | Age: 45
End: 2020-11-11

## 2020-11-11 NOTE — TELEPHONE ENCOUNTER
[facility] is switching pharmacy to [facility]. Nidia Lan requesting new script to be sent electronically for Gloves and depends.  Please send

## 2020-12-07 ENCOUNTER — OFFICE VISIT (OUTPATIENT)
Dept: FAMILY MEDICINE CLINIC | Age: 45
End: 2020-12-07
Payer: MEDICARE

## 2020-12-07 VITALS
TEMPERATURE: 97 F | HEIGHT: 60 IN | SYSTOLIC BLOOD PRESSURE: 120 MMHG | DIASTOLIC BLOOD PRESSURE: 80 MMHG | BODY MASS INDEX: 26.86 KG/M2 | HEART RATE: 70 BPM | WEIGHT: 136.8 LBS

## 2020-12-07 PROBLEM — I73.9 PERIPHERAL VASCULAR DISEASE, UNSPECIFIED (HCC): Status: RESOLVED | Noted: 2020-12-07 | Resolved: 2020-12-07

## 2020-12-07 PROBLEM — I73.9 PERIPHERAL VASCULAR DISEASE, UNSPECIFIED (HCC): Status: ACTIVE | Noted: 2020-12-07

## 2020-12-07 PROCEDURE — 99213 OFFICE O/P EST LOW 20 MIN: CPT | Performed by: FAMILY MEDICINE

## 2020-12-07 RX ORDER — SENNA PLUS 8.6 MG/1
2 TABLET ORAL DAILY
Qty: 60 TABLET | Refills: 11 | Status: SHIPPED | OUTPATIENT
Start: 2020-12-07 | End: 2021-12-06 | Stop reason: SDUPTHER

## 2020-12-07 RX ORDER — COAL TAR 0.57 G/114ML
SHAMPOO TOPICAL
Qty: 1 BOTTLE | Refills: 11 | Status: CANCELLED | OUTPATIENT
Start: 2020-12-07

## 2020-12-07 RX ORDER — INCONTINENCE PAD,LINER,DISP
EACH MISCELLANEOUS
Qty: 1 PACKAGE | Refills: 11 | Status: SHIPPED | OUTPATIENT
Start: 2020-12-07 | End: 2020-12-16 | Stop reason: SDUPTHER

## 2020-12-07 RX ORDER — DOCUSATE SODIUM 100 MG/1
100 CAPSULE, LIQUID FILLED ORAL NIGHTLY
Qty: 30 CAPSULE | Refills: 11 | Status: SHIPPED | OUTPATIENT
Start: 2020-12-07 | End: 2021-12-06 | Stop reason: SDUPTHER

## 2020-12-07 RX ORDER — CHOLECALCIFEROL (VITAMIN D3) 50 MCG
2000 TABLET ORAL DAILY
Qty: 30 TABLET | Refills: 11 | Status: SHIPPED | OUTPATIENT
Start: 2020-12-07 | End: 2021-10-07 | Stop reason: SDUPTHER

## 2020-12-07 RX ORDER — NYSTATIN 100000 [USP'U]/G
POWDER TOPICAL
Qty: 1 BOTTLE | Refills: 11 | Status: SHIPPED | OUTPATIENT
Start: 2020-12-07 | End: 2021-12-06 | Stop reason: SDUPTHER

## 2020-12-07 NOTE — PATIENT INSTRUCTIONS
PLEASE BRING YOUR MEDICATIONS TO ALL APPOINTMENTS    The diagnoses and medications listed in this after visit summary may not be accurate at the time of check out. Please check MY CHART in 28-48 hours for possible corrections. Late cancellation policy: So that we can better accommodate people who are sick, please give our office 24 hour notice for an appointment cancellation. Thank you. Missed appointments: Your care is very important to us. It is important that you keep your scheduled appointments. Multiple missed appointments will lead to a dismissal from the office. Later arrival policy: If you are more than 10 minutes late for your appointment, you will be asked to reschedule. Please allow 5-7 business days for paperwork to be processed. It is important that you check your MY Chart messages, as they include appointment reminders, test results, and other important information. If you have forgotten your password, please call [phone].          1. Take your blood pressure medications at night. This reduces your chance of cardiovascular event by half  2. Fever in kids: It's best to give both Tylenol and Ibuprofen at the same time rather than staggering them which is confusing  3. Pediatric obesity is decreased by less exposure to antibiotics, consuming whole milk instead of skim milk, watching public TV instead of regular TV, and experiencing fewer adverse childhood events  4. 1 egg per day is good for your heart  5. Alternate day fasting does promote weight loss. 6. Skipping breakfast increases your risk of obesity  7. Artificially sweetened drinks increase all cause mortality (strokes, BMI, cardiovascular)  8. Kale consumption can reduce onset of dementia  9. Walking at least 8000 steps per day and resistance exercise 2-3 x per week are good for your heart  10.  Covid 19:  Wearing gloves is not that helpful  Having low vitamin D levels increases risk of infection (take 2-4000 units of D3 per day until our covid crisis is resolved)  11.  Brushing teeth 3 times per day can decrease chance of getting diabetes

## 2020-12-07 NOTE — PROGRESS NOTES
Patient ID: Aubrey Espitia 1975    . Chief Complaint   Patient presents with    Constipation    Other     MRDD         HPI     Severe intellectual disability: lives in [facility] normally a bit anxious and agitated. Luz Maria  with caretaker today who is answering all the questions.       Urine and stool incontinence:  Wears adult diapers.  Stool softeners.  Needs refills. No bowel complaints from caretakers     Dandruff: uses dandruff shampoo. Caregiver is no longer seeing dandruff     Vitamin D deficiency: takes vitamin D supplementation. Has taken it for years     Rash: per caregiver, has diaper rash.       Dental problem: had teeth removed and bad breath better. Has more teeth that need to be removed, but dentist's office with scheduling delays due to covid    Review of Systems   Unable to perform ROS: Patient nonverbal       Patient Active Problem List   Diagnosis    Vitamin D deficiency    Severe intellectual disability    Constipation    Neuropathy    Urinary incontinence    Impulse control disorder in adult    Seborrhea    Charcot's syndrome    Chronic gum disease    Stool incontinence       Past Surgical History:   Procedure Laterality Date    DENTAL SURGERY      dental restoration on 3/2010 and 3/2011   [facility] DENTAL SURGERY  9/13/2013    Dental restorations, 2 teeth extractions    DENTAL SURGERY Bilateral [id number]    restorations    DENTAL SURGERY N/A 9/27/2019    DENTAL RESTORATIONSdental extractions x 2 top left, 2 bottom left, 1 bottom right, 1 top right  and 5 in front performed by Rakesh Blackbunr DDS at [facility]       No family history on file.     Current Outpatient Medications on File Prior to Visit   Medication Sig Dispense Refill    Cholecalciferol (VITAMIN D) 2000 units CAPS capsule Take by mouth      busPIRone (BUSPAR) 15 MG tablet Take 15 mg by mouth 2 times daily      citalopram (CELEXA) 40 MG tablet Take 1 tablet by mouth daily 30 tablet 5     No current facility-administered medications on file prior to visit. Objective:         Physical Exam  Vitals signs and nursing note reviewed. Constitutional:       Appearance: He is well-developed and well-groomed. Comments: No dandruff   HENT:      Head:      Comments: No bad breath today     Mouth/Throat:      Mouth: Mucous membranes are moist.      Comments: Uncooperative for opening mouth  Neck:      Thyroid: No thyromegaly. Cardiovascular:      Rate and Rhythm: Normal rate and regular rhythm. Heart sounds: Normal heart sounds, S1 normal and S2 normal.   Pulmonary:      Effort: No respiratory distress. Breath sounds: Normal breath sounds. No wheezing or rales. Skin:     General: Skin is warm and dry. Findings: No bruising. Comments: No dandruff   Neurological:      Mental Status: He is alert. Psychiatric:         Attention and Perception: He is inattentive. Speech: He is noncommunicative. Cognition and Memory: Cognition is impaired. Vitals:    12/07/20 1341   BP: 120/80   Pulse: 70   Temp: 97 °F (36.1 °C)   TempSrc: Infrared   Weight: 136 lb 12.8 oz (62.1 kg)   Height: 5' (1.524 m)     Body mass index is 26.72 kg/m². Wt Readings from Last 3 Encounters:   12/07/20 136 lb 12.8 oz (62.1 kg)   12/16/19 145 lb 12.8 oz (66.1 kg)   09/27/19 149 lb (67.6 kg)     BP Readings from Last 3 Encounters:   12/07/20 120/80   12/16/19 110/70   09/27/19 116/75          No results found for this visit on 12/07/20. The ASCVD Risk score (Audreyelaina Izquierdo., et al., 2013) failed to calculate for the following reasons:    Cannot find a previous HDL lab    Cannot find a previous total cholesterol lab  Lab Review not applicable        Assessment:       Diagnosis Orders   1. Severe intellectual disability  Disposable Gloves (NITRILE GLOVES LARGE) MISC    Incontinence Supply Disposable (DEPEND UNDERGARMENT EX ABSORB) MISC   2.  Constipation, unspecified constipation type  senna (SENOKOT) 8.6 MG tablet    Incontinence Supply Disposable (DEPEND UNDERGARMENT EX ABSORB) MISC    docusate sodium (COLACE) 100 MG capsule   3. Incontinence of feces, unspecified fecal incontinence type  Disposable Gloves (NITRILE GLOVES LARGE) MISC    Incontinence Supply Disposable (DEPEND UNDERGARMENT EX ABSORB) MISC   4. Urinary incontinence, unspecified type  Disposable Gloves (NITRILE GLOVES LARGE) MISC   5. Diaper rash  nystatin (MYCOSTATIN) 656567 UNIT/GM powder   6. Vitamin D deficiency  vitamin D (CHOLECALCIFEROL) 50 MCG (2000 UT) TABS tablet           Plan: Will hold off on the dandruff shampoo      Return in about 1 year (around 12/7/2021) for MRDD.

## 2020-12-15 ENCOUNTER — TELEPHONE (OUTPATIENT)
Dept: FAMILY MEDICINE CLINIC | Age: 45
End: 2020-12-15

## 2020-12-15 NOTE — TELEPHONE ENCOUNTER
Patient received one package of 28 depends. Patient uses 6 packages of 28 in one month. Please send new script. Patient uses KeySpan Size medication  They are like a diaper with tabs.

## 2020-12-16 RX ORDER — INCONTINENCE PAD,LINER,DISP
EACH MISCELLANEOUS
Qty: 6 PACKAGE | Refills: 11 | Status: SHIPPED | OUTPATIENT
Start: 2020-12-16 | End: 2021-12-06 | Stop reason: SDUPTHER

## 2021-10-07 ENCOUNTER — OFFICE VISIT (OUTPATIENT)
Dept: FAMILY MEDICINE CLINIC | Age: 46
End: 2021-10-07
Payer: MEDICARE

## 2021-10-07 VITALS
HEIGHT: 60 IN | BODY MASS INDEX: 22.38 KG/M2 | SYSTOLIC BLOOD PRESSURE: 110 MMHG | DIASTOLIC BLOOD PRESSURE: 70 MMHG | TEMPERATURE: 97.1 F | WEIGHT: 114 LBS | HEART RATE: 86 BPM

## 2021-10-07 DIAGNOSIS — Z13.220 SCREENING CHOLESTEROL LEVEL: ICD-10-CM

## 2021-10-07 DIAGNOSIS — R63.4 EXCESSIVE WEIGHT LOSS: ICD-10-CM

## 2021-10-07 DIAGNOSIS — R19.6 HALITOSIS: ICD-10-CM

## 2021-10-07 DIAGNOSIS — K02.9 DENTAL CARIES: Primary | ICD-10-CM

## 2021-10-07 DIAGNOSIS — Z11.59 NEED FOR HEPATITIS C SCREENING TEST: ICD-10-CM

## 2021-10-07 DIAGNOSIS — Z23 NEEDS FLU SHOT: ICD-10-CM

## 2021-10-07 LAB
A/G RATIO: 1.6 (ref 1.1–2.2)
ALBUMIN SERPL-MCNC: 4.3 G/DL (ref 3.4–5)
ALP BLD-CCNC: 52 U/L (ref 40–129)
ALT SERPL-CCNC: 15 U/L (ref 10–40)
ANION GAP SERPL CALCULATED.3IONS-SCNC: 13 MMOL/L (ref 3–16)
AST SERPL-CCNC: 27 U/L (ref 15–37)
BASOPHILS ABSOLUTE: 0 K/UL (ref 0–0.2)
BASOPHILS RELATIVE PERCENT: 0.5 %
BILIRUB SERPL-MCNC: 0.3 MG/DL (ref 0–1)
BUN BLDV-MCNC: 17 MG/DL (ref 7–20)
CALCIUM SERPL-MCNC: 10.1 MG/DL (ref 8.3–10.6)
CHLORIDE BLD-SCNC: 104 MMOL/L (ref 99–110)
CHOLESTEROL, TOTAL: 113 MG/DL (ref 0–199)
CO2: 25 MMOL/L (ref 21–32)
CREAT SERPL-MCNC: 1 MG/DL (ref 0.9–1.3)
EOSINOPHILS ABSOLUTE: 0.1 K/UL (ref 0–0.6)
EOSINOPHILS RELATIVE PERCENT: 3 %
FOLATE: 9.77 NG/ML (ref 4.78–24.2)
GFR AFRICAN AMERICAN: >60
GFR NON-AFRICAN AMERICAN: >60
GLOBULIN: 2.7 G/DL
GLUCOSE BLD-MCNC: 83 MG/DL (ref 70–99)
HCT VFR BLD CALC: 39.8 % (ref 40.5–52.5)
HDLC SERPL-MCNC: 40 MG/DL (ref 40–60)
HEMOGLOBIN: 13.4 G/DL (ref 13.5–17.5)
HEPATITIS C ANTIBODY INTERPRETATION: NORMAL
LDL CHOLESTEROL CALCULATED: 61 MG/DL
LYMPHOCYTES ABSOLUTE: 2.2 K/UL (ref 1–5.1)
LYMPHOCYTES RELATIVE PERCENT: 46.7 %
MCH RBC QN AUTO: 31.1 PG (ref 26–34)
MCHC RBC AUTO-ENTMCNC: 33.6 G/DL (ref 31–36)
MCV RBC AUTO: 92.6 FL (ref 80–100)
MONOCYTES ABSOLUTE: 0.4 K/UL (ref 0–1.3)
MONOCYTES RELATIVE PERCENT: 8.5 %
NEUTROPHILS ABSOLUTE: 2 K/UL (ref 1.7–7.7)
NEUTROPHILS RELATIVE PERCENT: 41.3 %
PDW BLD-RTO: 13 % (ref 12.4–15.4)
PLATELET # BLD: 166 K/UL (ref 135–450)
PMV BLD AUTO: 9.1 FL (ref 5–10.5)
POTASSIUM SERPL-SCNC: 5 MMOL/L (ref 3.5–5.1)
RBC # BLD: 4.3 M/UL (ref 4.2–5.9)
SODIUM BLD-SCNC: 142 MMOL/L (ref 136–145)
TOTAL PROTEIN: 7 G/DL (ref 6.4–8.2)
TRIGL SERPL-MCNC: 60 MG/DL (ref 0–150)
TSH REFLEX: 3.05 UIU/ML (ref 0.27–4.2)
VITAMIN B-12: 647 PG/ML (ref 211–911)
VLDLC SERPL CALC-MCNC: 12 MG/DL
WBC # BLD: 4.8 K/UL (ref 4–11)

## 2021-10-07 PROCEDURE — G0008 ADMIN INFLUENZA VIRUS VAC: HCPCS | Performed by: FAMILY MEDICINE

## 2021-10-07 PROCEDURE — 36415 COLL VENOUS BLD VENIPUNCTURE: CPT | Performed by: FAMILY MEDICINE

## 2021-10-07 PROCEDURE — G8427 DOCREV CUR MEDS BY ELIG CLIN: HCPCS | Performed by: FAMILY MEDICINE

## 2021-10-07 PROCEDURE — 99213 OFFICE O/P EST LOW 20 MIN: CPT | Performed by: FAMILY MEDICINE

## 2021-10-07 PROCEDURE — 90674 CCIIV4 VAC NO PRSV 0.5 ML IM: CPT | Performed by: FAMILY MEDICINE

## 2021-10-07 PROCEDURE — 1036F TOBACCO NON-USER: CPT | Performed by: FAMILY MEDICINE

## 2021-10-07 PROCEDURE — G8482 FLU IMMUNIZE ORDER/ADMIN: HCPCS | Performed by: FAMILY MEDICINE

## 2021-10-07 PROCEDURE — G8420 CALC BMI NORM PARAMETERS: HCPCS | Performed by: FAMILY MEDICINE

## 2021-10-07 RX ORDER — DOXYCYCLINE HYCLATE 100 MG
100 TABLET ORAL 2 TIMES DAILY
Qty: 20 TABLET | Refills: 0 | Status: SHIPPED | OUTPATIENT
Start: 2021-10-07 | End: 2021-10-17

## 2021-10-07 RX ORDER — MIRTAZAPINE 15 MG/1
15 TABLET, FILM COATED ORAL NIGHTLY
Qty: 30 TABLET | Refills: 0 | Status: SHIPPED | OUTPATIENT
Start: 2021-10-07 | End: 2021-12-06 | Stop reason: SDUPTHER

## 2021-10-07 ASSESSMENT — PATIENT HEALTH QUESTIONNAIRE - PHQ9: DEPRESSION UNABLE TO ASSESS: PT REFUSES

## 2021-10-07 NOTE — PROGRESS NOTES
Patient ID: Tamra Drew 1975    . Chief Complaint   Patient presents with    Weight Loss     eating somewhat normal about 1/2 of meal , has dental issues     Nasal Congestion         HPI     Weight loss: Ongoing for months. Patient is severely MRDD and nonverbal.  Caregivers have noted that he is eating about half of what he used to eat. They suspect is because of his severe dental caries. He was supposed to have had all of his teeth removed last year but it was canceled because of the pandemic. I are hoping to get him back into see his dentist so that his teeth can be removed. They have noticed a foul odor coming from his mouth. In the past, when he has a foul odor from his mouth, it means he has a dental infection. Nasal congestion: The staff reports that other staff mentioned he had nasal congestion. Review of Systems    Patient Active Problem List   Diagnosis    Vitamin D deficiency    Severe intellectual disability    Constipation    Neuropathy    Urinary incontinence    Impulse control disorder in adult    Seborrhea    Charcot's syndrome    Chronic gum disease    Stool incontinence       Past Surgical History:   Procedure Laterality Date    DENTAL SURGERY      dental restoration on 3/2010 and 3/2011   [facility] DENTAL SURGERY  9/13/2013    Dental restorations, 2 teeth extractions    DENTAL SURGERY Bilateral [id number]    restorations    DENTAL SURGERY N/A 9/27/2019    DENTAL RESTORATIONSdental extractions x 2 top left, 2 bottom left, 1 bottom right, 1 top right  and 5 in front performed by Jannette Fletcher DDS at [facility]       No family history on file. Current Outpatient Medications on File Prior to Visit   Medication Sig Dispense Refill    Incontinence Supply Disposable (DEPEND UNDERGARMENT EX ABSORB) MISC Patient uses 6 packages of 28 in one month.   Patient uses KeySpan Size 6 Package 11    senna (SENOKOT) 8.6 MG tablet Take 2 tablets by mouth daily 60 tablet 11  Disposable Gloves (NITRILE GLOVES LARGE) MISC Use as directed 100 each 11    docusate sodium (COLACE) 100 MG capsule Take 1 capsule by mouth nightly 30 capsule 11    nystatin (MYCOSTATIN) 654362 UNIT/GM powder 2 x day for 2 weeks per episode 1 Bottle 11    Cholecalciferol (VITAMIN D) 2000 units CAPS capsule Take by mouth      busPIRone (BUSPAR) 15 MG tablet Take 15 mg by mouth 2 times daily      citalopram (CELEXA) 40 MG tablet Take 1 tablet by mouth daily 30 tablet 5     No current facility-administered medications on file prior to visit. Objective:         Physical Exam  HENT:      Right Ear: Tympanic membrane normal.      Left Ear: Tympanic membrane normal.      Nose: Rhinorrhea present. Mouth/Throat:      Mouth: Mucous membranes are moist.      Dentition: Abnormal dentition. Gingival swelling and dental caries present. Comments: Patient would not open his mouth beyond smiling. Lymphadenopathy:      Head:      Right side of head: No submental or submandibular adenopathy. Left side of head: No submental or submandibular adenopathy. Cervical: No cervical adenopathy. Right cervical: No superficial, deep or posterior cervical adenopathy. Left cervical: No superficial, deep or posterior cervical adenopathy. Neurological:      Mental Status: He is alert. Mental status is at baseline. Psychiatric:         Mood and Affect: Mood normal.         Speech: Speech is delayed. Behavior: Behavior is slowed. Behavior is not aggressive. Cognition and Memory: Cognition is impaired. Memory is impaired. Vitals:    10/07/21 1012   BP: 110/70   Site: Left Upper Arm   Position: Sitting   Cuff Size: Medium Adult   Pulse: 86   Temp: 97.1 °F (36.2 °C)   TempSrc: Infrared   Weight: 114 lb (51.7 kg)   Height: 5' (1.524 m)     Body mass index is 22.26 kg/m².      Wt Readings from Last 3 Encounters:   10/07/21 114 lb (51.7 kg)   12/07/20 136 lb 12.8 oz (62.1 kg) 12/16/19 145 lb 12.8 oz (66.1 kg)     BP Readings from Last 3 Encounters:   10/07/21 110/70   12/07/20 120/80   12/16/19 110/70          No results found for this visit on 10/07/21. The ASCVD Risk score (Virginia Viramontes, et al., 2013) failed to calculate for the following reasons:    Cannot find a previous HDL lab    Cannot find a previous total cholesterol lab  Lab Review   No visits with results within 2 Month(s) from this visit. Latest known visit with results is:   Admission on 09/27/2019, Discharged on 09/27/2019   Component Date Value    Sodium 09/27/2019 139     Potassium 09/27/2019 4.5     Chloride 09/27/2019 103     CO2 09/27/2019 30     Anion Gap 09/27/2019 6     BUN 09/27/2019 17     CREATININE 09/27/2019 1.0     Glucose 09/27/2019 89     Calcium 09/27/2019 9.0     GFR Non- 09/27/2019 >60     GFR  09/27/2019 >60     WBC 09/27/2019 5.1     RBC 09/27/2019 4.68     Hemoglobin 09/27/2019 14.4     Hematocrit 09/27/2019 44.4     MCV 09/27/2019 94.9     MCH 09/27/2019 30.8     MCHC 09/27/2019 32.4     RDW 09/27/2019 12.6     Platelets 70/68/9128 174     MPV 09/27/2019 10.5            Assessment:       Diagnosis Orders   1. Dental caries  doxycycline hyclate (VIBRA-TABS) 100 MG tablet   2. Needs flu shot  INFLUENZA, MDCK QUADV, 2 YRS AND OLDER, IM, PF, PREFILL SYR OR SDV, 0.5ML (FLUCELVAX QUADV, PF)   3. Need for hepatitis C screening test  HEPATITIS C ANTIBODY   4. Screening cholesterol level  Lipid Panel   5. Excessive weight loss  CBC Auto Differential    Comprehensive Metabolic Panel    TSH with Reflex    Vitamin B12 & Folate    mirtazapine (REMERON) 15 MG tablet   6. Halitosis  doxycycline hyclate (VIBRA-TABS) 100 MG tablet           Plan:      Patient has lost about 18 pounds since he was last seen 10 months ago.   He appears to be behaving per his normal.    We will check labs    Recommend he take a nutritional supplement 3 times a day    We will give him Remeron to help to stimulate his appetite. He can be dosed at bedtime since it can make him sleepy    We will send letter to his dentist requesting ASAP evaluation. Return in about 1 month (around 11/7/2021) for excessive weight loss.

## 2021-10-07 NOTE — PATIENT INSTRUCTIONS
PLEASE BRING YOUR MEDICATIONS TO ALL APPOINTMENTS    The diagnoses and medications listed in this after visit summary may not be accurate at the time of check out. Please check MY CHART in 28-48 hours for possible corrections. Late cancellation policy: So that we can better accommodate people who are sick, please give our office 24 hour notice for an appointment cancellation. Thank you. Missed appointments: Your care is very important to us. It is important that you keep your scheduled appointments. Multiple missed appointments will lead to a dismissal from the office. Later arrival policy: If you are more than 10 minutes late for your appointment, you will be asked to re-schedule. Please allow 5-7 business days for paperwork to be processed. It is important that you check your MY Chart messages, as they include appointment reminders, test results, and other important information. If you have forgotten your password, please call [phone]. HERE ARE SOME LIFE CHANGING TIPS      1. Take your blood pressure medications at bedtime to reduce your chance of heart attack or stroke  2. Fever in kids:  Give both Tylenol and Ibuprofen at the same time rather than staggering them   3. Follow these tips to reduce childhood obesity: Reduce unnecessary exposure to antibiotics, consume whole milk instead of skim milk, watch public TV instead of regular TV (less exposure to junk food commercials), and reduce traumatic experiences. 4. 1 egg per day is good for your heart  5. Alternate day fasting does promote weight loss. Skipping breakfast increases your risk of obesity  6. Artificially sweetened drinks increase all cause mortality (strokes, body mass index, cardiovascular disease)  7. Kale consumption can reduce onset of dementia  8. Walking at least 8000 steps per day and resistance exercise 2-3 x per week are good for your heart  9.  Brushing teeth 3 times per day can decrease chance of getting diabetes  10. Antibiotic use is associated with a lifetime increased risk of breast cancer and heart disease.

## 2021-10-07 NOTE — LETTER
[place] [address] [address]  Phone: [phone]  Fax: [phone]    Zehra Leal MD          October 7, 2021     Dr. Selena Lassiter:    Patient: Madi Ryan   MR Number: [id number]   YOB: 1975   Date of Visit: 10/7/2021       Dear Dr. Bruce Guo:    I am referring my patient, Kitty York, to you for evaluation of severe dental caries and excessive weight loss. He would benefit from evaluation and treatment as soon as possible as I suspect his weight loss is due to dental problems. He  has a past medical history of Constipation, Dandruff in adult, H/O mental retardation, HX OTHER MEDICAL, Hyperlipidemia, Impulse disorder, Periodontal disease, and Urinary incontinence. His  has a past surgical history that includes Dental surgery; Dental surgery (9/13/2013); Dental surgery (Bilateral, [id number]); and Dental surgery (N/A, 9/27/2019). He  reports that he has never smoked. He has never used smokeless tobacco. He reports that he does not drink alcohol and does not use drugs. He has a current medication list which includes the following prescription(s): mirtazapine, doxycycline hyclate, depend undergarment ex absorb, senna, nitrile gloves large, docusate sodium, nystatin, vitamin d, buspirone, and citalopram. He is allergic to other. I appreciate your assistance in his care and look forward to your findings and recommendations.     Sincerely,                           Zehra Leal MD

## 2021-10-14 ENCOUNTER — TELEPHONE (OUTPATIENT)
Dept: FAMILY MEDICINE CLINIC | Age: 46
End: 2021-10-14

## 2021-10-14 NOTE — TELEPHONE ENCOUNTER
So the pharmacy only gave 3 weeks worth of medication? I gave him a month supply at his last appointment and he was supposed to follow-up in a month.

## 2021-10-14 NOTE — TELEPHONE ENCOUNTER
Pharmacy is requesting refill on mirtazepine. Month supply was sent in on 10-7-21. Did you want to keep him on this?

## 2021-10-14 NOTE — TELEPHONE ENCOUNTER
He has an appointment to see me in a few weeks to reassess of the mirtazapine is working. We can evaluate the need for the refill at that time.

## 2021-10-14 NOTE — TELEPHONE ENCOUNTER
Patient will be out of mirtazepine on 10/31/21. Cecily Guerrero wanted to know if it is okay for him not to have any until his appointment?

## 2021-10-15 DIAGNOSIS — R63.4 EXCESSIVE WEIGHT LOSS: Primary | ICD-10-CM

## 2021-10-15 RX ORDER — MIRTAZAPINE 15 MG/1
15 TABLET, FILM COATED ORAL NIGHTLY
Qty: 30 TABLET | Refills: 0 | Status: SHIPPED | OUTPATIENT
Start: 2021-10-15 | End: 2021-11-23 | Stop reason: SDUPTHER

## 2021-10-15 NOTE — TELEPHONE ENCOUNTER
Ina José stated that the pharmacy pre package their medications so when the patient was seen on 10/7/2021 the pharmacy filled that script till the end of October. So when November starts the patient will not have any of the Mirtazepine to take.  Please advise     Thank you

## 2021-11-08 ENCOUNTER — OFFICE VISIT (OUTPATIENT)
Dept: FAMILY MEDICINE CLINIC | Age: 46
End: 2021-11-08
Payer: MEDICARE

## 2021-11-08 VITALS
SYSTOLIC BLOOD PRESSURE: 102 MMHG | HEIGHT: 60 IN | DIASTOLIC BLOOD PRESSURE: 70 MMHG | HEART RATE: 61 BPM | WEIGHT: 116 LBS | BODY MASS INDEX: 22.78 KG/M2 | TEMPERATURE: 98.1 F

## 2021-11-08 DIAGNOSIS — R56.9 SEIZURE (HCC): ICD-10-CM

## 2021-11-08 DIAGNOSIS — K06.9 CHRONIC GUM DISEASE: ICD-10-CM

## 2021-11-08 DIAGNOSIS — Z87.898 HISTORY OF WEIGHT LOSS: Primary | ICD-10-CM

## 2021-11-08 DIAGNOSIS — F72 SEVERE INTELLECTUAL DISABILITY: ICD-10-CM

## 2021-11-08 LAB
A/G RATIO: 1.6 (ref 1.1–2.2)
ALBUMIN SERPL-MCNC: 4.3 G/DL (ref 3.4–5)
ALP BLD-CCNC: 62 U/L (ref 40–129)
ALT SERPL-CCNC: 24 U/L (ref 10–40)
ANION GAP SERPL CALCULATED.3IONS-SCNC: 9 MMOL/L (ref 3–16)
AST SERPL-CCNC: 32 U/L (ref 15–37)
BASOPHILS ABSOLUTE: 0 K/UL (ref 0–0.2)
BASOPHILS RELATIVE PERCENT: 0.7 %
BILIRUB SERPL-MCNC: <0.2 MG/DL (ref 0–1)
BUN BLDV-MCNC: 22 MG/DL (ref 7–20)
CALCIUM SERPL-MCNC: 10 MG/DL (ref 8.3–10.6)
CHLORIDE BLD-SCNC: 104 MMOL/L (ref 99–110)
CO2: 28 MMOL/L (ref 21–32)
CREAT SERPL-MCNC: 0.9 MG/DL (ref 0.9–1.3)
EOSINOPHILS ABSOLUTE: 0.2 K/UL (ref 0–0.6)
EOSINOPHILS RELATIVE PERCENT: 3.8 %
GFR AFRICAN AMERICAN: >60
GFR NON-AFRICAN AMERICAN: >60
GLUCOSE BLD-MCNC: 79 MG/DL (ref 70–99)
HCT VFR BLD CALC: 38.8 % (ref 40.5–52.5)
HEMOGLOBIN: 13.2 G/DL (ref 13.5–17.5)
LYMPHOCYTES ABSOLUTE: 2.2 K/UL (ref 1–5.1)
LYMPHOCYTES RELATIVE PERCENT: 46 %
MAGNESIUM: 2.1 MG/DL (ref 1.8–2.4)
MCH RBC QN AUTO: 31.1 PG (ref 26–34)
MCHC RBC AUTO-ENTMCNC: 33.9 G/DL (ref 31–36)
MCV RBC AUTO: 91.8 FL (ref 80–100)
MONOCYTES ABSOLUTE: 0.5 K/UL (ref 0–1.3)
MONOCYTES RELATIVE PERCENT: 10 %
NEUTROPHILS ABSOLUTE: 1.9 K/UL (ref 1.7–7.7)
NEUTROPHILS RELATIVE PERCENT: 39.5 %
PDW BLD-RTO: 13.3 % (ref 12.4–15.4)
PHOSPHORUS: 3.4 MG/DL (ref 2.5–4.9)
PLATELET # BLD: 140 K/UL (ref 135–450)
PMV BLD AUTO: 9.8 FL (ref 5–10.5)
POTASSIUM SERPL-SCNC: 4.2 MMOL/L (ref 3.5–5.1)
RBC # BLD: 4.23 M/UL (ref 4.2–5.9)
SODIUM BLD-SCNC: 141 MMOL/L (ref 136–145)
TOTAL PROTEIN: 7 G/DL (ref 6.4–8.2)
WBC # BLD: 4.7 K/UL (ref 4–11)

## 2021-11-08 PROCEDURE — 99213 OFFICE O/P EST LOW 20 MIN: CPT | Performed by: FAMILY MEDICINE

## 2021-11-08 PROCEDURE — G8420 CALC BMI NORM PARAMETERS: HCPCS | Performed by: FAMILY MEDICINE

## 2021-11-08 PROCEDURE — G8482 FLU IMMUNIZE ORDER/ADMIN: HCPCS | Performed by: FAMILY MEDICINE

## 2021-11-08 PROCEDURE — 1036F TOBACCO NON-USER: CPT | Performed by: FAMILY MEDICINE

## 2021-11-08 PROCEDURE — G8427 DOCREV CUR MEDS BY ELIG CLIN: HCPCS | Performed by: FAMILY MEDICINE

## 2021-11-08 PROCEDURE — 36415 COLL VENOUS BLD VENIPUNCTURE: CPT | Performed by: FAMILY MEDICINE

## 2021-11-08 RX ORDER — LEVETIRACETAM 250 MG/1
250 TABLET ORAL 2 TIMES DAILY
Qty: 60 TABLET | Refills: 5 | Status: SHIPPED | OUTPATIENT
Start: 2021-11-08 | End: 2022-04-26 | Stop reason: SDUPTHER

## 2021-11-08 ASSESSMENT — PATIENT HEALTH QUESTIONNAIRE - PHQ9: DEPRESSION UNABLE TO ASSESS: PT REFUSES

## 2021-11-08 NOTE — PROGRESS NOTES
Patient ID: Ruslan Islas 1975    Chief Complaint   Patient presents with    Weight Loss         HPI     Weight: See last note for details. Caregiver says she has not noticed a recent change in his diet compared to the last visit. He is taking nutritional supplement to help with his weight. She did speak to the dentist about his situation. 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 He is not getting proper reimbursement for these procedures. He is hoping that the situation will change. Per caregiver, patient reportedly had a seizure while he was at work. He has not had a seizure in about 7 years. See paperwork for details. He has not had a seizure since and is acting normal.      Review of Systems    Patient Active Problem List   Diagnosis    Vitamin D deficiency    Severe intellectual disability    Constipation    Neuropathy    Urinary incontinence    Impulse control disorder in adult    Seborrhea    Charcot's syndrome    Chronic gum disease    Stool incontinence    History of weight loss       Past Surgical History:   Procedure Laterality Date    DENTAL SURGERY      dental restoration on 3/2010 and 3/2011   Nallely Schilling DENTAL SURGERY  9/13/2013    Dental restorations, 2 teeth extractions    DENTAL SURGERY Bilateral [id number]    restorations    DENTAL SURGERY N/A 9/27/2019    DENTAL RESTORATIONSdental extractions x 2 top left, 2 bottom left, 1 bottom right, 1 top right  and 5 in front performed by Ovi Regan DDS at [facility]       No family history on file. Current Outpatient Medications on File Prior to Visit   Medication Sig Dispense Refill    mirtazapine (REMERON) 15 MG tablet Take 1 tablet by mouth nightly 30 tablet 0    Incontinence Supply Disposable (DEPEND UNDERGARMENT EX ABSORB) MISC Patient uses 6 packages of 28 in one month.   Patient uses [employer] Size 6 Package 11    senna (SENOKOT) 8.6 MG tablet Take 2 tablets by mouth daily 60 tablet 11    Disposable Gloves (NITRILE GLOVES LARGE) MISC Use as directed 100 each 11    docusate sodium (COLACE) 100 MG capsule Take 1 capsule by mouth nightly 30 capsule 11    nystatin (MYCOSTATIN) 798123 UNIT/GM powder 2 x day for 2 weeks per episode 1 Bottle 11    busPIRone (BUSPAR) 15 MG tablet Take 15 mg by mouth 2 times daily      citalopram (CELEXA) 40 MG tablet Take 1 tablet by mouth daily 30 tablet 5    mirtazapine (REMERON) 15 MG tablet Take 1 tablet by mouth nightly (Patient not taking: Reported on 11/8/2021) 30 tablet 0    Cholecalciferol (VITAMIN D) 2000 units CAPS capsule Take by mouth (Patient not taking: Reported on 11/8/2021)       No current facility-administered medications on file prior to visit. Objective:           Physical Exam  HENT:      Mouth/Throat:      Dentition: Abnormal dentition. Dental caries present. Neurological:      Mental Status: He is alert. Mental status is at baseline. Cranial Nerves: Cranial nerves are intact. Motor: Seizure activity present. Gait: Gait is intact. Psychiatric:         Mood and Affect: Mood normal.         Speech: He is noncommunicative. Behavior: Behavior is slowed. Cognition and Memory: Cognition is impaired. Memory is impaired. Vitals:    11/08/21 0825   BP: 102/70   Site: Left Upper Arm   Position: Sitting   Cuff Size: Medium Adult   Pulse: 61   Temp: 98.1 °F (36.7 °C)   TempSrc: Infrared   Weight: 116 lb (52.6 kg)   Height: 5' (1.524 m)     Body mass index is 22.65 kg/m². Wt Readings from Last 3 Encounters:   11/08/21 116 lb (52.6 kg)   10/07/21 114 lb (51.7 kg)   12/07/20 136 lb 12.8 oz (62.1 kg)     BP Readings from Last 3 Encounters:   11/08/21 102/70   10/07/21 110/70   12/07/20 120/80          No results found for this visit on 11/08/21. Assessment:       Diagnosis Orders   1. History of weight loss     2.  Seizure ([facility] [address]  Comprehensive Metabolic Panel    MAGNESIUM    PHOSPHORUS    CBC WITH AUTO DIFFERENTIAL    levETIRAcetam (KEPPRA) 250 MG tablet   3. Chronic gum disease     4. Severe intellectual disability             Plan:      See orders    Concerning the seizure, will start him on Keppra. If no seizures for 6 months we will start to wean down on the Keppra. Today's weight is 2 pounds improved from the last time. Continue with the nutritional supplements. I am just happy that he is not losing weight at this time. Recheck in 1 month for seizure disorder and his weight. Return if symptoms worsen or fail to improve, for Add this dx to next visit. seizure.

## 2021-11-08 NOTE — PATIENT INSTRUCTIONS
PLEASE BRING YOUR MEDICATIONS TO ALL APPOINTMENTS    The diagnoses and medications listed in this after visit summary may not be accurate at the time of check out. Please check MY CHART in 28-48 hours for possible corrections. Late cancellation policy: So that we can better accommodate people who are sick, please give our office 24 hour notice for an appointment cancellation. Thank you. Missed appointments: Your care is very important to us. It is important that you keep your scheduled appointments. Multiple missed appointments will lead to a dismissal from the office. Later arrival policy: If you are more than 10 minutes late for your appointment, you will be asked to re-schedule. Please allow 5-7 business days for paperwork to be processed. It is important that you check your MY Chart messages, as they include appointment reminders, test results, and other important information. If you have forgotten your password, please call [phone]. HERE ARE SOME LIFE CHANGING TIPS      1. Take your blood pressure medications at bedtime to reduce your chance of heart attack or stroke  2. Fever in kids:  Give both Tylenol and Ibuprofen at the same time rather than staggering them   3. Follow these tips to reduce childhood obesity: Reduce unnecessary exposure to antibiotics, consume whole milk instead of skim milk, watch public TV instead of regular TV (less exposure to junk food commercials), and reduce traumatic experiences. 4. 1 egg per day is good for your heart  5. Alternate day fasting does promote weight loss. Skipping breakfast increases your risk of obesity  6. Artificially sweetened drinks increase all cause mortality (strokes, body mass index, cardiovascular disease)  7. Kale consumption can reduce onset of dementia  8. Walking at least 8000 steps per day and resistance exercise 2-3 x per week are good for your heart  9.  Brushing teeth 3 times per day can decrease chance of getting diabetes  10. Antibiotic use is associated with a lifetime increased risk of breast cancer and heart disease.

## 2021-11-09 DIAGNOSIS — K59.00 CONSTIPATION, UNSPECIFIED CONSTIPATION TYPE: ICD-10-CM

## 2021-11-09 DIAGNOSIS — R63.4 EXCESSIVE WEIGHT LOSS: ICD-10-CM

## 2021-11-09 RX ORDER — DOCUSATE SODIUM 100 MG/1
100 CAPSULE, LIQUID FILLED ORAL NIGHTLY
Qty: 31 CAPSULE | Refills: 11 | OUTPATIENT
Start: 2021-11-09

## 2021-11-09 RX ORDER — MIRTAZAPINE 15 MG/1
15 TABLET, FILM COATED ORAL NIGHTLY
Qty: 30 TABLET | Refills: 10 | OUTPATIENT
Start: 2021-11-09

## 2021-11-09 RX ORDER — SENNOSIDES 8.6 MG/1
TABLET ORAL
Qty: 62 TABLET | Refills: 11 | OUTPATIENT
Start: 2021-11-09

## 2021-11-15 DIAGNOSIS — R63.4 EXCESSIVE WEIGHT LOSS: ICD-10-CM

## 2021-11-18 RX ORDER — MIRTAZAPINE 15 MG/1
15 TABLET, FILM COATED ORAL NIGHTLY
Qty: 30 TABLET | Refills: 10 | OUTPATIENT
Start: 2021-11-18

## 2021-11-23 ENCOUNTER — TELEPHONE (OUTPATIENT)
Dept: FAMILY MEDICINE CLINIC | Age: 46
End: 2021-11-23

## 2021-11-23 DIAGNOSIS — R63.4 EXCESSIVE WEIGHT LOSS: ICD-10-CM

## 2021-11-23 RX ORDER — MIRTAZAPINE 15 MG/1
15 TABLET, FILM COATED ORAL NIGHTLY
Qty: 30 TABLET | Refills: 0 | Status: SHIPPED | OUTPATIENT
Start: 2021-11-23 | End: 2021-12-06

## 2021-11-23 NOTE — TELEPHONE ENCOUNTER
Please check with pharmacy . 1 month supply was given October 7 and another 1 month supply sent the following week. Can they see if they have any on hold.

## 2021-11-23 NOTE — TELEPHONE ENCOUNTER
I personally called pharmacy. They said they did fill the prescription on the seventh of October for only 25 pills. The next month he filled it for 30 pills now they needed to be rewritten again. I did explain to them that I find this process frustrating as I spent a lot of time we writing prescriptions to the pharmacy. Another month supply sent.

## 2021-11-24 DIAGNOSIS — R15.9 INCONTINENCE OF FECES, UNSPECIFIED FECAL INCONTINENCE TYPE: ICD-10-CM

## 2021-11-24 DIAGNOSIS — R32 URINARY INCONTINENCE, UNSPECIFIED TYPE: ICD-10-CM

## 2021-11-24 DIAGNOSIS — F72 SEVERE INTELLECTUAL DISABILITY: ICD-10-CM

## 2021-12-06 ENCOUNTER — OFFICE VISIT (OUTPATIENT)
Dept: FAMILY MEDICINE CLINIC | Age: 46
End: 2021-12-06
Payer: MEDICARE

## 2021-12-06 VITALS
SYSTOLIC BLOOD PRESSURE: 112 MMHG | OXYGEN SATURATION: 95 % | BODY MASS INDEX: 23.56 KG/M2 | TEMPERATURE: 97.8 F | HEIGHT: 60 IN | WEIGHT: 120 LBS | HEART RATE: 83 BPM | DIASTOLIC BLOOD PRESSURE: 64 MMHG

## 2021-12-06 DIAGNOSIS — L22 DIAPER RASH: ICD-10-CM

## 2021-12-06 DIAGNOSIS — F72 SEVERE INTELLECTUAL DISABILITY: Primary | ICD-10-CM

## 2021-12-06 DIAGNOSIS — K59.00 CONSTIPATION, UNSPECIFIED CONSTIPATION TYPE: ICD-10-CM

## 2021-12-06 DIAGNOSIS — Z12.11 SCREEN FOR COLON CANCER: ICD-10-CM

## 2021-12-06 DIAGNOSIS — R32 URINARY INCONTINENCE, UNSPECIFIED TYPE: ICD-10-CM

## 2021-12-06 DIAGNOSIS — H61.23 IMPACTED CERUMEN OF BOTH EARS: ICD-10-CM

## 2021-12-06 DIAGNOSIS — R15.9 INCONTINENCE OF FECES, UNSPECIFIED FECAL INCONTINENCE TYPE: ICD-10-CM

## 2021-12-06 PROCEDURE — 99214 OFFICE O/P EST MOD 30 MIN: CPT | Performed by: FAMILY MEDICINE

## 2021-12-06 PROCEDURE — G8420 CALC BMI NORM PARAMETERS: HCPCS | Performed by: FAMILY MEDICINE

## 2021-12-06 PROCEDURE — G8427 DOCREV CUR MEDS BY ELIG CLIN: HCPCS | Performed by: FAMILY MEDICINE

## 2021-12-06 PROCEDURE — 1036F TOBACCO NON-USER: CPT | Performed by: FAMILY MEDICINE

## 2021-12-06 PROCEDURE — G8482 FLU IMMUNIZE ORDER/ADMIN: HCPCS | Performed by: FAMILY MEDICINE

## 2021-12-06 RX ORDER — NYSTATIN 100000 [USP'U]/G
POWDER TOPICAL
Qty: 1 EACH | Refills: 11 | Status: SHIPPED | OUTPATIENT
Start: 2021-12-06 | End: 2022-10-26 | Stop reason: SDUPTHER

## 2021-12-06 RX ORDER — INCONTINENCE PAD,LINER,DISP
EACH MISCELLANEOUS
Qty: 6 EACH | Refills: 11 | Status: SHIPPED | OUTPATIENT
Start: 2021-12-06 | End: 2022-04-11 | Stop reason: SDUPTHER

## 2021-12-06 RX ORDER — DOCUSATE SODIUM 100 MG/1
100 CAPSULE, LIQUID FILLED ORAL NIGHTLY
Qty: 30 CAPSULE | Refills: 11 | Status: SHIPPED | OUTPATIENT
Start: 2021-12-06 | End: 2022-04-26 | Stop reason: ALTCHOICE

## 2021-12-06 RX ORDER — MIRTAZAPINE 15 MG/1
15 TABLET, FILM COATED ORAL NIGHTLY
Qty: 30 TABLET | Refills: 5 | Status: SHIPPED | OUTPATIENT
Start: 2021-12-06 | End: 2021-12-06 | Stop reason: ALTCHOICE

## 2021-12-06 RX ORDER — SENNA PLUS 8.6 MG/1
2 TABLET ORAL DAILY
Qty: 60 TABLET | Refills: 11 | Status: SHIPPED | OUTPATIENT
Start: 2021-12-06 | End: 2022-10-26 | Stop reason: ALTCHOICE

## 2021-12-06 SDOH — ECONOMIC STABILITY: FOOD INSECURITY: WITHIN THE PAST 12 MONTHS, THE FOOD YOU BOUGHT JUST DIDN'T LAST AND YOU DIDN'T HAVE MONEY TO GET MORE.: NEVER TRUE

## 2021-12-06 SDOH — ECONOMIC STABILITY: FOOD INSECURITY: WITHIN THE PAST 12 MONTHS, YOU WORRIED THAT YOUR FOOD WOULD RUN OUT BEFORE YOU GOT MONEY TO BUY MORE.: NEVER TRUE

## 2021-12-06 ASSESSMENT — PATIENT HEALTH QUESTIONNAIRE - PHQ9
SUM OF ALL RESPONSES TO PHQ9 QUESTIONS 1 & 2: 0
2. FEELING DOWN, DEPRESSED OR HOPELESS: 0
1. LITTLE INTEREST OR PLEASURE IN DOING THINGS: 0
SUM OF ALL RESPONSES TO PHQ QUESTIONS 1-9: 0

## 2021-12-06 ASSESSMENT — SOCIAL DETERMINANTS OF HEALTH (SDOH): HOW HARD IS IT FOR YOU TO PAY FOR THE VERY BASICS LIKE FOOD, HOUSING, MEDICAL CARE, AND HEATING?: NOT HARD AT ALL

## 2021-12-06 NOTE — PATIENT INSTRUCTIONS
PLEASE BRING YOUR MEDICATIONS TO ALL APPOINTMENTS    The diagnoses and medications listed in this after visit summary may not be accurate at the time of check out. Please check MY CHART in 28-48 hours for possible corrections. Late cancellation policy: So that we can better accommodate people who are sick, please give our office 24 hour notice for an appointment cancellation. Thank you. Missed appointments: Your care is very important to us. It is important that you keep your scheduled appointments. Multiple missed appointments will lead to a dismissal from the office. Later arrival policy: If you are more than 10 minutes late for your appointment, you will be asked to re-schedule. Please allow 5-7 business days for paperwork to be processed. It is important that you check your MY Chart messages, as they include appointment reminders, test results, and other important information. If you have forgotten your password, please call [phone]. HERE ARE SOME LIFE CHANGING TIPS      1. Take your blood pressure medications at bedtime to reduce your chance of heart attack or stroke  2. Fever in kids:  Give both Tylenol and Ibuprofen at the same time rather than staggering them   3. Follow these tips to reduce childhood obesity: Reduce unnecessary exposure to antibiotics, consume whole milk instead of skim milk, watch public TV instead of regular TV (less exposure to junk food commercials), and reduce traumatic experiences. 4. 1 egg per day is good for your heart  5. Alternate day fasting does promote weight loss. Skipping breakfast increases your risk of obesity  6. Artificially sweetened drinks increase all cause mortality (strokes, body mass index, cardiovascular disease)  7. Kale consumption can reduce onset of dementia  8. Walking at least 8000 steps per day and resistance exercise 2-3 x per week are good for your heart  9.  Brushing teeth 3 times per day can decrease chance of getting diabetes  10. Antibiotic use is associated with a lifetime increased risk of breast cancer and heart disease. Patient Education        Earwax Blockage: Care Instructions  Your Care Instructions    Earwax is a natural substance that protects the ear canal. Normally, earwax drains from the ears and does not cause problems. Sometimes earwax builds up and hardens. Earwax blockage (also called cerumen impaction) can cause some loss of hearing and pain. When wax is tightly packed, you will need to have your doctor remove it. Follow-up care is a key part of your treatment and safety. Be sure to make and go to all appointments, and call your doctor if you are having problems. It's also a good idea to know your test results and keep a list of the medicines you take. How can you care for yourself at home? · Do not try to remove earwax with cotton swabs, fingers, or other objects. This can make the blockage worse and damage the eardrum. · If your doctor recommends that you try to remove earwax at home:  ? Soften and loosen the earwax with warm mineral oil. You also can try hydrogen peroxide mixed with an equal amount of room temperature water. Place 2 drops of the fluid, warmed to body temperature, in the ear two times a day for up to 5 days. ? Once the wax is loose and soft, all that is usually needed to remove it from the ear canal is a gentle, warm shower. Direct the water into the ear, then tip your head to let the earwax drain out. Dry your ear thoroughly with a hair dryer set on low. Hold the dryer several inches from your ear. ? If the warm mineral oil and shower do not work, use an over-the-counter wax softener. Read and follow all instructions on the label. After using the wax softener, use an ear syringe to gently flush the ear. Make sure the flushing solution is body temperature. Cool or hot fluids in the ear can cause dizziness. When should you call for help?   Call your doctor now or seek immediate medical care if:    · Pus or blood drains from your ear. · Your ears are ringing or feel full. · You have a loss of hearing. Watch closely for changes in your health, and be sure to contact your doctor if:    · You have pain or reduced hearing after 1 week of home treatment. · You have any new symptoms, such as nausea or balance problems. Where can you learn more? Go to https://[employer]. org and sign in to your [facility] account. Enter R067 in the Thounds box to learn more about \"Earwax Blockage: Care Instructions. \"     If you do not have an account, please click on the \"Sign Up Now\" link. Current as of: September 23, 2018  Content Version: 12.1  © 8398-2864 [employer]. Care instructions adapted under license by [facility] ([facility]). If you have questions about a medical condition or this instruction, always ask your healthcare professional. [place] [address] any warranty or liability for your use of this information.

## 2021-12-06 NOTE — PROGRESS NOTES
Patient ID: Jocelyn Arreola 1975    . Chief Complaint   Patient presents with    Seizures    Mental Health Problem     MRDD         HPI     Severe intellectual disability: lives in [facility] normally a bit anxious and agitated. Milligan Angel with caretaker today who is answering all the questions.       Urine and stool incontinence:  Wears adult diapers.  Stool softeners.  Needs refills.  No bowel complaints from caretakers     Dandruff: uses dandruff shampoo. Caregiver is no longer seeing dandruff     Vitamin D deficiency: takes vitamin D supplementation.  Has taken it for years     Rash: per caregiver, has diaper rash. On and off. Use nystatin as needed     Dental problem: had 2 teeth fall out and is feeling better. they have notified the dentist's office    Weight loss: has stopped and is now gaining weight    Review of Systems    Patient Active Problem List   Diagnosis    Vitamin D deficiency    Severe intellectual disability    Constipation    Neuropathy    Urinary incontinence    Impulse control disorder in adult    Seborrhea    Charcot's syndrome    Chronic gum disease    Stool incontinence    History of weight loss       Past Surgical History:   Procedure Laterality Date    DENTAL SURGERY      dental restoration on 3/2010 and 3/2011   Alanna Lennon DENTAL SURGERY  9/13/2013    Dental restorations, 2 teeth extractions    DENTAL SURGERY Bilateral [id number]    restorations    DENTAL SURGERY N/A 9/27/2019    DENTAL RESTORATIONSdental extractions x 2 top left, 2 bottom left, 1 bottom right, 1 top right  and 5 in front performed by Tina Lennox, DDS at [facility] 145       No family history on file.     Current Outpatient Medications on File Prior to Visit   Medication Sig Dispense Refill    Nutritional Supplements (ENSURE ACTIVE PO) Take by mouth      levETIRAcetam (KEPPRA) 250 MG tablet Take 1 tablet by mouth 2 times daily 60 tablet 5    busPIRone (BUSPAR) 15 MG tablet Take 15 mg by mouth 2 times daily  citalopram (CELEXA) 40 MG tablet Take 1 tablet by mouth daily 30 tablet 5     No current facility-administered medications on file prior to visit. Objective:         Physical Exam  Vitals and nursing note reviewed. HENT:      Head: Normocephalic and atraumatic. Comments: No hallitosis     Right Ear: There is impacted cerumen. Left Ear: There is impacted cerumen. Cardiovascular:      Rate and Rhythm: Normal rate and regular rhythm. Heart sounds: Normal heart sounds, S1 normal and S2 normal.   Pulmonary:      Effort: Pulmonary effort is normal.      Breath sounds: Normal breath sounds. Abdominal:      Palpations: Abdomen is soft. There is no mass. Tenderness: There is no abdominal tenderness. Musculoskeletal:      Right lower leg: No edema. Left lower leg: No edema. Skin:     General: Skin is warm and dry. Neurological:      Mental Status: He is alert. Mental status is at baseline. Psychiatric:         Mood and Affect: Mood normal.         Speech: Speech is delayed. Behavior: Behavior is slowed. Cognition and Memory: Cognition is impaired. Memory is impaired. Vitals:    12/06/21 0923   BP: 112/64   Site: Left Upper Arm   Position: Sitting   Cuff Size: Medium Adult   Pulse: 83   Temp: 97.8 °F (36.6 °C)   TempSrc: Infrared   SpO2: 95%   Weight: 120 lb (54.4 kg)   Height: 5' (1.524 m)     Body mass index is 23.44 kg/m². Wt Readings from Last 3 Encounters:   12/06/21 120 lb (54.4 kg)   11/08/21 116 lb (52.6 kg)   10/07/21 114 lb (51.7 kg)     BP Readings from Last 3 Encounters:   12/06/21 112/64   11/08/21 102/70   10/07/21 110/70          No results found for this visit on 12/06/21. The ASCVD Risk score (Jeralene Brunner., et al., 2013) failed to calculate for the following reasons:     The valid total cholesterol range is 130 to 320 mg/dL  Lab Review   Office Visit on 11/08/2021   Component Date Value    Sodium 11/08/2021 141     Potassium 11/08/2021 4.2     Chloride 11/08/2021 104     CO2 11/08/2021 28     Anion Gap 11/08/2021 9     Glucose 11/08/2021 79     BUN 11/08/2021 22*    CREATININE 11/08/2021 0.9     GFR Non- 11/08/2021 >60     GFR  11/08/2021 >60     Calcium 11/08/2021 10.0     Total Protein 11/08/2021 7.0     Albumin 11/08/2021 4.3     Albumin/Globulin Ratio 11/08/2021 1.6     Total Bilirubin 11/08/2021 <0.2     Alkaline Phosphatase 11/08/2021 62     ALT 11/08/2021 24     AST 11/08/2021 32     Magnesium 11/08/2021 2.10     Phosphorus 11/08/2021 3.4     WBC 11/08/2021 4.7     RBC 11/08/2021 4.23     Hemoglobin 11/08/2021 13.2*    Hematocrit 11/08/2021 38.8*    MCV 11/08/2021 91.8     MCH 11/08/2021 31.1     MCHC 11/08/2021 33.9     RDW 11/08/2021 13.3     Platelets 66/14/3415 140     MPV 11/08/2021 9.8     Neutrophils % 11/08/2021 39.5     Lymphocytes % 11/08/2021 46.0     Monocytes % 11/08/2021 10.0     Eosinophils % 11/08/2021 3.8     Basophils % 11/08/2021 0.7     Neutrophils Absolute 11/08/2021 1.9     Lymphocytes Absolute 11/08/2021 2.2     Monocytes Absolute 11/08/2021 0.5     Eosinophils Absolute 11/08/2021 0.2     Basophils Absolute 11/08/2021 0.0    Office Visit on 10/07/2021   Component Date Value    Hep C Ab Interp 10/07/2021 Non-reactive     Cholesterol, Total 10/07/2021 113     Triglycerides 10/07/2021 60     HDL 10/07/2021 40     LDL Calculated 10/07/2021 61     VLDL Cholesterol Calcula* 10/07/2021 12     WBC 10/07/2021 4.8     RBC 10/07/2021 4.30     Hemoglobin 10/07/2021 13.4*    Hematocrit 10/07/2021 39.8*    MCV 10/07/2021 92.6     MCH 10/07/2021 31.1     MCHC 10/07/2021 33.6     RDW 10/07/2021 13.0     Platelets 73/22/8987 166     MPV 10/07/2021 9.1     Neutrophils % 10/07/2021 41.3     Lymphocytes % 10/07/2021 46.7     Monocytes % 10/07/2021 8.5     Eosinophils % 10/07/2021 3.0     Basophils % 10/07/2021 0.5     Neutrophils Absolute 10/07/2021 2.0     Lymphocytes Absolute 10/07/2021 2.2     Monocytes Absolute 10/07/2021 0.4     Eosinophils Absolute 10/07/2021 0.1     Basophils Absolute 10/07/2021 0.0     Sodium 10/07/2021 142     Potassium 10/07/2021 5.0     Chloride 10/07/2021 104     CO2 10/07/2021 25     Anion Gap 10/07/2021 13     Glucose 10/07/2021 83     BUN 10/07/2021 17     CREATININE 10/07/2021 1.0     GFR Non- 10/07/2021 >60     GFR  10/07/2021 >60     Calcium 10/07/2021 10.1     Total Protein 10/07/2021 7.0     Albumin 10/07/2021 4.3     Albumin/Globulin Ratio 10/07/2021 1.6     Total Bilirubin 10/07/2021 0.3     Alkaline Phosphatase 10/07/2021 52     ALT 10/07/2021 15     AST 10/07/2021 27     Globulin 10/07/2021 2.7     TSH 10/07/2021 3.05     Vitamin B-12 10/07/2021 647     Folate 10/07/2021 9.77            Assessment:       Diagnosis Orders   1. Severe intellectual disability  Disposable Gloves (NITRILE GLOVES LARGE) MISC    Incontinence Supply Disposable (DEPEND UNDERGARMENT EX ABSORB) MISC   2. Screen for colon cancer     3. Diaper rash  nystatin (MYCOSTATIN) 260464 UNIT/GM powder   4. Constipation, unspecified constipation type  docusate sodium (COLACE) 100 MG capsule    senna (SENOKOT) 8.6 MG tablet    Incontinence Supply Disposable (DEPEND UNDERGARMENT EX ABSORB) MISC   5. Incontinence of feces, unspecified fecal incontinence type  Disposable Gloves (NITRILE GLOVES LARGE) MISC    Incontinence Supply Disposable (DEPEND UNDERGARMENT EX ABSORB) MISC   6. Urinary incontinence, unspecified type  Disposable Gloves (NITRILE GLOVES LARGE) MISC   7. Impacted cerumen of both ears             Plan:      Stop the Remeron since doing better. Can continue the Ensure    Can instill baby oil in the ears (doubt patient can tolerate ear irrigation due to MRDD)    Patient is due for colon cancer screening. . Caretaker Is preferring to have stool testing done rather than get a colonoscopy. The patient does not have a family history of colon cancer and does not have bleeding when they have bowel movements. Therefore, fit test  was given to patient. The patient will be contacted in 2 weeks if the fit test has not been returned. If the fit test is positive, then the patient will be referred for a colonoscopy. Return in about 5 months (around 4/26/2022) for F.card/ MA 1-2 weeks, MRDD.

## 2021-12-08 ENCOUNTER — TELEPHONE (OUTPATIENT)
Dept: FAMILY MEDICINE CLINIC | Age: 46
End: 2021-12-08

## 2021-12-08 NOTE — TELEPHONE ENCOUNTER
The directions would not necessarily say how many drops, but they could say to fill the ear canal with baby oil. Is this OK?   I just cannot say how many drops it would take to fill the ear canal.

## 2021-12-08 NOTE — TELEPHONE ENCOUNTER
She said can it say use one whole dropper,  fill the dropper half way, she said state is very picky on how it supposed to be done, she said also stated how long is this suppose to be done. Is he one the drops for a week, 3 days. How long does the patient needs to use the drops.  Please advise

## 2021-12-08 NOTE — TELEPHONE ENCOUNTER
Aidee Sires asking for letter with directions  for putting baby oil in patient ears. Aidee Sires asking how many drops or dropper full in each ear, duration and how many days , ei: 1 week, or is it routine.

## 2021-12-10 ENCOUNTER — HOSPITAL ENCOUNTER (EMERGENCY)
Age: 46
Discharge: HOME | End: 2021-12-11
Payer: MEDICARE

## 2021-12-10 VITALS
RESPIRATION RATE: 18 BRPM | HEART RATE: 59 BPM | BODY MASS INDEX: 23.56 KG/M2 | DIASTOLIC BLOOD PRESSURE: 82 MMHG | TEMPERATURE: 98.8 F | SYSTOLIC BLOOD PRESSURE: 96 MMHG | HEIGHT: 60 IN | WEIGHT: 120 LBS | OXYGEN SATURATION: 94 %

## 2021-12-10 DIAGNOSIS — R63.0 DECREASE IN APPETITE: Primary | ICD-10-CM

## 2021-12-10 DIAGNOSIS — R32 URINARY INCONTINENCE, UNSPECIFIED TYPE: ICD-10-CM

## 2021-12-10 LAB
ANION GAP SERPL CALCULATED.3IONS-SCNC: 7 MMOL/L (ref 4–16)
BASOPHILS ABSOLUTE: 0 K/CU MM
BASOPHILS RELATIVE PERCENT: 0.5 % (ref 0–1)
BUN BLDV-MCNC: 24 MG/DL (ref 6–23)
CALCIUM SERPL-MCNC: 9.3 MG/DL (ref 8.3–10.6)
CHLORIDE BLD-SCNC: 97 MMOL/L (ref 99–110)
CO2: 28 MMOL/L (ref 21–32)
CREAT SERPL-MCNC: 1 MG/DL (ref 0.9–1.3)
DIFFERENTIAL TYPE: ABNORMAL
EOSINOPHILS ABSOLUTE: 0 K/CU MM
EOSINOPHILS RELATIVE PERCENT: 0 % (ref 0–3)
GFR AFRICAN AMERICAN: >60 ML/MIN/1.73M2
GFR NON-AFRICAN AMERICAN: >60 ML/MIN/1.73M2
GLUCOSE BLD-MCNC: 100 MG/DL (ref 70–99)
HCT VFR BLD CALC: 40.5 % (ref 42–52)
HEMOGLOBIN: 13.6 GM/DL (ref 13.5–18)
IMMATURE NEUTROPHIL %: 0.3 % (ref 0–0.43)
LIPASE: 34 IU/L (ref 13–60)
LYMPHOCYTES ABSOLUTE: 1.3 K/CU MM
LYMPHOCYTES RELATIVE PERCENT: 36 % (ref 24–44)
MCH RBC QN AUTO: 31.3 PG (ref 27–31)
MCHC RBC AUTO-ENTMCNC: 33.6 % (ref 32–36)
MCV RBC AUTO: 93.1 FL (ref 78–100)
MONOCYTES ABSOLUTE: 0.7 K/CU MM
MONOCYTES RELATIVE PERCENT: 18.4 % (ref 0–4)
NUCLEATED RBC %: 0 %
PDW BLD-RTO: 13 % (ref 11.7–14.9)
PLATELET # BLD: 156 K/CU MM (ref 140–440)
PMV BLD AUTO: 10 FL (ref 7.5–11.1)
POTASSIUM SERPL-SCNC: 3.9 MMOL/L (ref 3.5–5.1)
RBC # BLD: 4.35 M/CU MM (ref 4.6–6.2)
SEGMENTED NEUTROPHILS ABSOLUTE COUNT: 1.7 K/CU MM
SEGMENTED NEUTROPHILS RELATIVE PERCENT: 44.8 % (ref 36–66)
SODIUM BLD-SCNC: 132 MMOL/L (ref 135–145)
TOTAL IMMATURE NEUTOROPHIL: 0.01 K/CU MM
TOTAL NUCLEATED RBC: 0 K/CU MM
TROPONIN T: <0.01 NG/ML
WBC # BLD: 3.7 K/CU MM (ref 4–10.5)

## 2021-12-10 PROCEDURE — 85025 COMPLETE CBC W/AUTO DIFF WBC: CPT

## 2021-12-10 PROCEDURE — 99283 EMERGENCY DEPT VISIT LOW MDM: CPT

## 2021-12-10 PROCEDURE — 80048 BASIC METABOLIC PNL TOTAL CA: CPT

## 2021-12-10 PROCEDURE — 51798 US URINE CAPACITY MEASURE: CPT

## 2021-12-10 PROCEDURE — 99284 EMERGENCY DEPT VISIT MOD MDM: CPT

## 2021-12-10 PROCEDURE — 84484 ASSAY OF TROPONIN QUANT: CPT

## 2021-12-10 PROCEDURE — 83690 ASSAY OF LIPASE: CPT

## 2021-12-10 NOTE — ED PROVIDER NOTES
Emergency Department Encounter  Location: [address] [place] [address] EMERGENCY DEPARTMENT    Patient: Charlotte Jensen  MRN: [id number]  : 1975  Date of evaluation: 12/10/2021  ED Provider: Parisa Anguiano PA-C    4520  Charlotte Jensen was checked out to me by . Please see his/her initial documentation for details of the patient's initial ED presentation, physical exam and completed studies. In brief, Charlotte Jensen is a 48 y.o. male that presented to the emergency department today to the ED sent in for decreased appetite. Patient is nonverbal and cannot provide hx.      I have reviewed and interpreted all of the currently available lab results and diagnostics from this visit:  Results for orders placed or performed during the hospital encounter of 12/10/21   CBC auto diff   Result Value Ref Range    WBC 3.7 (L) 4.0 - 10.5 K/CU MM    RBC 4.35 (L) 4.6 - 6.2 M/CU MM    Hemoglobin 13.6 13.5 - 18.0 GM/DL    Hematocrit 40.5 (L) 42 - 52 %    MCV 93.1 78 - 100 FL    MCH 31.3 (H) 27 - 31 PG    MCHC 33.6 32.0 - 36.0 %    RDW 13.0 11.7 - 14.9 %    Platelets 991 954 - 047 K/CU MM    MPV 10.0 7.5 - 11.1 FL    Differential Type AUTOMATED DIFFERENTIAL     Segs Relative 44.8 36 - 66 %    Lymphocytes % 36.0 24 - 44 %    Monocytes % 18.4 (H) 0 - 4 %    Eosinophils % 0.0 0 - 3 %    Basophils % 0.5 0 - 1 %    Segs Absolute 1.7 K/CU MM    Lymphocytes Absolute 1.3 K/CU MM    Monocytes Absolute 0.7 K/CU MM    Eosinophils Absolute 0.0 K/CU MM    Basophils Absolute 0.0 K/CU MM    Nucleated RBC % 0.0 %    Total Nucleated RBC 0.0 K/CU MM    Total Immature Neutrophil 0.01 K/CU MM    Immature Neutrophil % 0.3 0 - 0.43 %   BMP   Result Value Ref Range    Sodium 132 (L) 135 - 145 MMOL/L    Potassium 3.9 3.5 - 5.1 MMOL/L    Chloride 97 (L) 99 - 110 mMol/L    CO2 28 21 - 32 MMOL/L    Anion Gap 7 4 - 16    BUN 24 (H) 6 - 23 MG/DL    CREATININE 1.0 0.9 - 1.3 MG/DL    Glucose 100 (H) 70 - 99 MG/DL    Calcium 9.3 8.3 - 10.6 MG/DL    GFR Non-African American >60 >60 mL/min/1.73m2    GFR African American >60 >60 mL/min/1.73m2   Troponin   Result Value Ref Range    Troponin T <0.010 <0.01 NG/ML   Lipase   Result Value Ref Range    Lipase 34 13 - 60 IU/L     No results found. Final ED Course and MDM: In brief, Pepper Waite is a 48 y.o. male whose care was signed out to me by the outgoing provider. In brief, she presents today to the ED for decreased appetite. Labs are unremarkable abdominal exam is unremarkable. CBC metabolic panel troponin lipase negative. Imaging negative. Urinalysis was ordered. Patient does not provide urinalysis. We did attempt to straight cath without success secondary to prostate obstruction. Bladder scan reveals no significant retention. Patient will be discharged home his physical exam is unremarkable history is not overtly concerning for emergent process at this time. ED Medication Orders (From admission, onward)    None          Final Impression      1. Decrease in appetite    2.  Urinary incontinence, unspecified type        DISPOSITION Decision To Discharge 12/11/2021 01:41:33 AM     (Please note that portions of this note may have been completed with a voice recognition program. Efforts were made to edit the dictations but occasionally words are mis-transcribed.)    Jack Leon PA-C  [address] [place] [address] , [place]  12/11/21 9909

## 2021-12-10 NOTE — ED PROVIDER NOTES
**ADVANCED PRACTICE PROVIDER, I HAVE EVALUATED THIS PATIENT**        [address] ENCOUNTER      Pt Name: Lorenza Saldivar  PSC:[id number]  Nelsygfmarichuy 1975  Date of evaluation: 12/10/2021  Provider: Trice Giang PA-C      Chief Complaint:    Chief Complaint   Patient presents with    Other     Patient from group home; nurse reports not eating for 24hrs         Nursing Notes, Past Medical Hx, Past Surgical Hx, Social Hx, Allergies, and Family Hx were all reviewed and agreed with or any disagreements were addressed in the HPI.    HPI: (Location, Duration, Timing, Severity, Quality, Assoc Sx, Context, Modifying factors)    Chief Complaint of dental pain, decreased appetite    This is a  48 y.o. male who presents with concern for possible dental pain, decreased appetite. Per nursing, patient is nonverbal, is a resident of the [facility] group home. Patient was apparently transferred by EMS from there to here. Nursing had spoken with staff, who mentioned that they were concerned for dental issue, and have been unable to follow-up with a dental provider due to the pandemic. Additionally, they mention that patient has had a decreased appetite. Apparently there is some concern for decreased urination because of the appetite, however staff had reported a well filled adult depends. Legal guardian his mother, who reportedly may be out of state. Otherwise no reported altered mental status, vomiting, difficulty breathing, difficulty swallowing, abdominal pain, fever.     PastMedical/Surgical History:      Diagnosis Date    Constipation     Dandruff in adult     H/O mental retardation      per caregiver \" he is pretty much non-verbal just makes some noises\",ambulatory, feeds and dresses self with assist\"    HX OTHER MEDICAL     old chart gives hx of Charciots Syndrome with associated neuropathy    Hyperlipidemia     Impulse disorder     Periodontal disease     Urinary incontinence     wears depends( urine and bowel incont)         Procedure Laterality Date    DENTAL SURGERY      dental restoration on 3/2010 and 3/2011   Isaac Mo DENTAL SURGERY  9/13/2013    Dental restorations, 2 teeth extractions    DENTAL SURGERY Bilateral [id number]    restorations    DENTAL SURGERY N/A 9/27/2019    DENTAL RESTORATIONSdental extractions x 2 top left, 2 bottom left, 1 bottom right, 1 top right  and 5 in front performed by Kiara Abraham DDS at [facility] OR       Medications:  Discharge Medication List as of 12/11/2021  2:06 AM      CONTINUE these medications which have NOT CHANGED    Details   Nutritional Supplements (ENSURE ACTIVE PO) Take by mouthHistorical Med      nystatin (MYCOSTATIN) 500061 UNIT/GM powder 2 x day for 2 weeks per episode, Disp-1 each, R-11, Normal      docusate sodium (COLACE) 100 MG capsule Take 1 capsule by mouth nightly, Disp-30 capsule, R-11Normal      Disposable Gloves (NITRILE GLOVES LARGE) MISC Use as directed, Disp-100 each, R-11Normal      senna (SENOKOT) 8.6 MG tablet Take 2 tablets by mouth daily, Disp-60 tablet, R-11Normal      Incontinence Supply Disposable (DEPEND UNDERGARMENT EX ABSORB) MISC Disp-6 each, R-11, NormalPatient uses 6 packages of 28 in one month. Patient uses Caryl Brand Super Size      levETIRAcetam (KEPPRA) 250 MG tablet Take 1 tablet by mouth 2 times daily, Disp-60 tablet, R-5Normal      busPIRone (BUSPAR) 15 MG tablet Take 15 mg by mouth 2 times dailyHistorical Med      citalopram (CELEXA) 40 MG tablet Take 1 tablet by mouth daily, Disp-30 tablet, R-5               Review of Systems:  (2-9 systems needed)  Review of Systems   Unable to perform ROS: Patient nonverbal       \"Positives and Pertinent negatives as per HPI\"    Physical Exam:  Physical Exam  Constitutional:       General: He is not in acute distress. HENT:      Head: Normocephalic.       Right Ear: Tympanic membrane normal.      Left Ear: Tympanic membrane normal.      Nose: No rhinorrhea. Mouth/Throat:      Mouth: Mucous membranes are moist.      Pharynx: No posterior oropharyngeal erythema. Eyes:      Extraocular Movements: Extraocular movements intact. Cardiovascular:      Rate and Rhythm: Normal rate. Pulses: Normal pulses. Pulmonary:      Breath sounds: Normal breath sounds. Abdominal:      General: There is no distension. Tenderness: There is no abdominal tenderness. Musculoskeletal:      Cervical back: No rigidity. Skin:     General: Skin is warm. Neurological:      Mental Status: He is alert. Mental status is at baseline. Psychiatric:         Behavior: Behavior is not agitated, aggressive, withdrawn or combative. MEDICAL DECISION MAKING    Vitals:    Vitals:    12/10/21 1155   BP: 96/82   Pulse: 59   Resp: 18   Temp: 98.8 °F (37.1 °C)   TempSrc: Oral   SpO2: 94%   Weight: 120 lb (54.4 kg)   Height: 5' (1.524 m)       LABS:  Labs Reviewed   CBC WITH AUTO DIFFERENTIAL - Abnormal; Notable for the following components:       Result Value    WBC 3.7 (*)     RBC 4.35 (*)     Hematocrit 40.5 (*)     MCH 31.3 (*)     Monocytes % 18.4 (*)     All other components within normal limits   BASIC METABOLIC PANEL - Abnormal; Notable for the following components:    Sodium 132 (*)     Chloride 97 (*)     BUN 24 (*)     Glucose 100 (*)     All other components within normal limits   TROPONIN   LIPASE   URINALYSIS        Remainder of labs reviewed and were negative at this time or not returned at the time of this note. RADIOLOGY:   Non-plain film images such as CT, Ultrasound and MRI are read by the radiologist. Bk Coleman PA-C have directly visualized the radiologic plain film image(s) with the below findings:      Interpretation per the Radiologist below, if available at the time of this note:    No orders to display        No results found.        MEDICAL DECISION MAKING / ED COURSE:      PROCEDURES: Procedures    None    Patient was given:  Medications - No data to display    Patient presented with above HPI. Registration is attempting to contact patient's mother. Group home was contacted, and per nursing, they were concerned for dental pain, and decreased appetite. Vitals within normal limits. On my examination, patient was alert, however nonverbal.  He is in no acute distress. He has been ambulatory here in the emergency department. Intraoral examination is somewhat limited due to patient cooperation, however he has no facial swelling, he is managing his own secretions and no obvious gingival swelling. Normal posterior oropharynx. No trismus. No abdominal pain. Past medical history includes MRDD Charcot syndrome with associated neuropathy, urinary incontinence, impulse control, constipation, periodontal disease. This point, we are attempting to contact power of , meanwhile we will plan for basic lab work, urinalysis. @16:30 I spoke with patient mother via telephone, she is the guardian. She was unaware the patient was here. She does mention that patient had a recent dentist appointment. I did discuss the group homes concern for decreased appetite, dental issues. At this time, patient exam findings are reassuring. No evidence of any facial swelling, dental abscess, trismus. I do feel patient is safe for outpatient follow-up for his dental issues. His abdomen is soft, nontender. No nausea no vomiting. Mild leukopenia, otherwise chemistries CBC unremarkable. @18:12 at this time is end of my shift. Patient's urinalysis is still pending. Straight cath has been ordered. Due to shift change, care of this patient will be transferred over to night shift. I have signed out Hung Ribera 's emergency department care to [facility]. We discussed the pertinent history, physical exam, completed/pending test results (if applicable) and current treatment plan.  Please refer to his/her chart for the patient's remaining emergency department course and final disposition. The patient tolerated their visit well. I evaluated the patient. The physician was available for consultation as needed. The patient and / or the family were informed of the results of any tests, a time was given to answer questions, a plan was proposed and they agreed with plan. CLINICAL IMPRESSION:  1. Decrease in appetite    2.  Urinary incontinence, unspecified type        DISPOSITION        PATIENT REFERRED TO:  Shad Liz MD  [id number] [facility]  [phone]            DISCHARGE MEDICATIONS:  Discharge Medication List as of 12/11/2021  2:06 AM          DISCONTINUED MEDICATIONS:  Discharge Medication List as of 12/11/2021  2:06 AM                 (Please note the MDM and HPI sections of this note were completed with a voice recognition program.  Efforts were made to edit the dictations but occasionally words are mis-transcribed.)    Electronically signed, Dioni Neely PA-C,          Dioni Neely PA-C  12/12/21 8966

## 2021-12-10 NOTE — TELEPHONE ENCOUNTER
To clarify, is it one whole dropper or half a dropper?   Again my preference would be for it to say to fill the ear canal.

## 2021-12-10 NOTE — ED NOTES
[facility], reports they cannot send someone to sit with patient due to the pandemic and decreased staffing. Reports urinary difficulties and dental issues. Carlos Guajardo is not on call today but we can call owner with any other concerns, updates, admission, or discharge information.     On call: Brian Reece RN  12/10/21 8227 [facility], RN  12/10/21 9960

## 2021-12-10 NOTE — LETTER
[facility] Emergency Department  [address] [place] [id number]  Phone: [phone]  Fax: [phone]               December 11, 2021    Patient: Madi Ryan   YOB: 1975   Date of Visit: 12/10/2021       To Whom It May Concern:    Kitty York was seen and treated in our emergency department on 12/10/2021. He may return to back to work without restrictions on 12/11/2021.       Sincerely,           Signature:__________________________________

## 2021-12-10 NOTE — ED NOTES
This nurse at bedside with patient for safety. Patient calm, cooperative laying in bed.      Kalani Valente RN  12/10/21 1455

## 2021-12-10 NOTE — ED TRIAGE NOTES
Patient to ED by [facility]. Patient sent from [facility] where nurse reports that the patient has not ate for 24 hours. Nurse reports this being unusual for patient. Patient is nonverbal.  Nurse reports that patient may have some dental issues that is causing the patient to not want to eat. Nurse states the patient has been using ensure type drinks due to weight loss over the past 8 months. Please call nurse at [phone] for updates.

## 2021-12-11 NOTE — ED PROVIDER NOTES
EKG is interpreted by me. EKG shows sinus rhythm at 69 bpm, axis appears to be mildly left deviated, no remarkable system elevations or depressions, flattened T waves in precordial lateral leads, SD interval 122, QRS duration of 76, QTC of 405. Final impression, nonspecific EKG.     Jadyn Madrid MD  12/10/2021  9:23 PM        Jadyn Madrid MD  12/10/21 2123

## 2021-12-11 NOTE — ED NOTES
Discharge paperwork reviewed with Pt, all questions answered.  Pt ambulated to lobby with care giver       Zackary Palm RN  12/11/21 2037

## 2021-12-14 ENCOUNTER — HOSPITAL ENCOUNTER (OUTPATIENT)
Age: 46
Setting detail: SPECIMEN
Discharge: HOME | End: 2021-12-14
Payer: MEDICARE

## 2021-12-14 ENCOUNTER — NURSE ONLY (OUTPATIENT)
Dept: FAMILY MEDICINE CLINIC | Age: 46
End: 2021-12-14
Payer: MEDICARE

## 2021-12-14 DIAGNOSIS — Z20.828 EXPOSURE TO SARS-ASSOCIATED CORONAVIRUS: Primary | ICD-10-CM

## 2021-12-14 PROCEDURE — 99211 OFF/OP EST MAY X REQ PHY/QHP: CPT | Performed by: FAMILY MEDICINE

## 2021-12-14 PROCEDURE — U0003 INFECTIOUS AGENT DETECTION BY NUCLEIC ACID (DNA OR RNA); SEVERE ACUTE RESPIRATORY SYNDROME CORONAVIRUS 2 (SARS-COV-2) (CORONAVIRUS DISEASE [COVID-19]), AMPLIFIED PROBE TECHNIQUE, MAKING USE OF HIGH THROUGHPUT TECHNOLOGIES AS DESCRIBED BY CMS-2020-01-R: HCPCS

## 2021-12-14 PROCEDURE — U0005 INFEC AGEN DETEC AMPLI PROBE: HCPCS

## 2021-12-15 LAB
SARS-COV-2: DETECTED
SOURCE: ABNORMAL

## 2022-01-06 ENCOUNTER — NURSE ONLY (OUTPATIENT)
Dept: FAMILY MEDICINE CLINIC | Age: 47
End: 2022-01-06
Payer: MEDICARE

## 2022-01-06 DIAGNOSIS — Z12.11 COLON CANCER SCREENING: Primary | ICD-10-CM

## 2022-01-06 LAB
CONTROL: NORMAL
HEMOCCULT STL QL: NEGATIVE

## 2022-01-06 PROCEDURE — 82274 ASSAY TEST FOR BLOOD FECAL: CPT | Performed by: FAMILY MEDICINE

## 2022-01-14 ENCOUNTER — OFFICE VISIT (OUTPATIENT)
Dept: FAMILY MEDICINE CLINIC | Age: 47
End: 2022-01-14
Payer: MEDICARE

## 2022-01-14 VITALS
HEART RATE: 62 BPM | WEIGHT: 117.8 LBS | DIASTOLIC BLOOD PRESSURE: 70 MMHG | SYSTOLIC BLOOD PRESSURE: 118 MMHG | OXYGEN SATURATION: 97 % | BODY MASS INDEX: 23.13 KG/M2 | TEMPERATURE: 97.1 F | RESPIRATION RATE: 15 BRPM | HEIGHT: 60 IN

## 2022-01-14 DIAGNOSIS — R63.4 WEIGHT LOSS: Primary | ICD-10-CM

## 2022-01-14 PROCEDURE — G8427 DOCREV CUR MEDS BY ELIG CLIN: HCPCS | Performed by: FAMILY MEDICINE

## 2022-01-14 PROCEDURE — G8482 FLU IMMUNIZE ORDER/ADMIN: HCPCS | Performed by: FAMILY MEDICINE

## 2022-01-14 PROCEDURE — 1036F TOBACCO NON-USER: CPT | Performed by: FAMILY MEDICINE

## 2022-01-14 PROCEDURE — G8420 CALC BMI NORM PARAMETERS: HCPCS | Performed by: FAMILY MEDICINE

## 2022-01-14 PROCEDURE — 99213 OFFICE O/P EST LOW 20 MIN: CPT | Performed by: FAMILY MEDICINE

## 2022-01-14 NOTE — PATIENT INSTRUCTIONS
PLEASE BRING YOUR MEDICATIONS TO ALL APPOINTMENTS    The diagnoses and medications listed in this after visit summary may not be accurate at the time of check out. Please check MY CHART in 28-48 hours for possible corrections. Late cancellation policy: So that we can better accommodate people who are sick, please give our office 24 hour notice for an appointment cancellation. Missed appointments: Your care is very important to us. It is important that you keep your scheduled appointments. Multiple missed appointments will lead to a dismissal from the office. Patients arriving late will be worked into the schedule as time permits, with patients arriving on time taken as scheduled. Late arriving patients are more than welcome to wait or reschedule their appointments. Please allow 5-7 business days for paperwork to be processed. It is important that you check your MY Chart messages, as they include appointment reminders, test results, and other important information. If you have forgotten your password, please call [phone]. HERE ARE SOME LIFE CHANGING TIPS      1. Take your blood pressure medications at bedtime to reduce your chance of heart attack or stroke  2. Fever in kids:  Give both Tylenol and Ibuprofen at the same time rather than staggering them   3. Follow these tips to reduce childhood obesity: Reduce unnecessary exposure to antibiotics, consume whole milk instead of skim milk, watch public TV instead of regular TV (less exposure to junk food commercials), and reduce traumatic experiences. 4. 1 egg per day is good for your heart  5. Alternate day fasting does promote weight loss. Skipping breakfast increases your risk of obesity  6. Artificially sweetened drinks increase all cause mortality (strokes, body mass index, cardiovascular disease)  7. Kale consumption can reduce onset of dementia  8.  Walking at least 8000 steps per day and resistance exercise 2-3 x per week are good for your heart  9. Brushing teeth 3 times per day can decrease chance of getting diabetes  10. Antibiotic use is associated with a lifetime increased risk of breast cancer and heart disease.

## 2022-01-14 NOTE — PROGRESS NOTES
Patient ID: Scooter Bowers 1975    . Chief Complaint   Patient presents with   [facility] ED Follow-up     12-10-21 for  decreased appetite, urinary incontinance, issues with teeth, lathargic         HPI     ER follow-up: Was seen in the emergency room a month ago for poor appetite. See emergency room note for details. Per caregiver, patient is back to his normal behavior. He seems to be a bit more relaxed. He had gained some weight since the last time he was seen in the emergency room. Review of Systems    Patient Active Problem List   Diagnosis    Vitamin D deficiency    Severe intellectual disability    Constipation    Neuropathy    Urinary incontinence    Impulse control disorder in adult    Seborrhea    Charcot's syndrome    Chronic gum disease    Stool incontinence    History of weight loss       Past Surgical History:   Procedure Laterality Date    DENTAL SURGERY      dental restoration on 3/2010 and 3/2011   [facility] DENTAL SURGERY  9/13/2013    Dental restorations, 2 teeth extractions    DENTAL SURGERY Bilateral [id number]    restorations    DENTAL SURGERY N/A 9/27/2019    DENTAL RESTORATIONSdental extractions x 2 top left, 2 bottom left, 1 bottom right, 1 top right  and 5 in front performed by Jose Castillo DDS at [facility]       No family history on file. Current Outpatient Medications on File Prior to Visit   Medication Sig Dispense Refill    Nutritional Supplements (ENSURE ACTIVE PO) Take by mouth      nystatin (MYCOSTATIN) 615515 UNIT/GM powder 2 x day for 2 weeks per episode 1 each 11    docusate sodium (COLACE) 100 MG capsule Take 1 capsule by mouth nightly 30 capsule 11    Disposable Gloves (NITRILE GLOVES LARGE) MISC Use as directed 100 each 11    senna (SENOKOT) 8.6 MG tablet Take 2 tablets by mouth daily 60 tablet 11    Incontinence Supply Disposable (DEPEND UNDERGARMENT EX ABSORB) MISC Patient uses 6 packages of 28 in one month.   Patient uses KeySpan Size 6 each 11  levETIRAcetam (KEPPRA) 250 MG tablet Take 1 tablet by mouth 2 times daily 60 tablet 5    busPIRone (BUSPAR) 15 MG tablet Take 15 mg by mouth 2 times daily      citalopram (CELEXA) 40 MG tablet Take 1 tablet by mouth daily 30 tablet 5     No current facility-administered medications on file prior to visit. Objective:         Physical Exam  HENT:      Mouth/Throat:      Mouth: Mucous membranes are moist.      Comments: Does not open his mouth  Neurological:      Mental Status: He is alert. Mental status is at baseline. Psychiatric:         Mood and Affect: Mood normal.         Speech: Speech is delayed. Behavior: Behavior is slowed. Cognition and Memory: Cognition is impaired. Memory is impaired. Vitals:    01/14/22 0913   BP: 118/70   Site: Left Upper Arm   Position: Sitting   Cuff Size: Medium Adult   Pulse: 62   Resp: 15   Temp: 97.1 °F (36.2 °C)   TempSrc: Infrared   SpO2: 97%   Weight: 117 lb 12.8 oz (53.4 kg)   Height: 5' (1.524 m)     Body mass index is 23.01 kg/m². Wt Readings from Last 3 Encounters:   01/14/22 117 lb 12.8 oz (53.4 kg)   12/10/21 120 lb (54.4 kg)   12/06/21 120 lb (54.4 kg)     BP Readings from Last 3 Encounters:   01/14/22 118/70   12/10/21 96/82   12/06/21 112/64          No results found for this visit on 01/14/22. The ASCVD Risk score (Davy Neil, et al., 2013) failed to calculate for the following reasons:     The valid total cholesterol range is 130 to 320 mg/dL  Lab Review   Nurse Only on 01/06/2022   Component Date Value    OCCULT BLOOD FECAL 01/06/2022 negative    Hospital Outpatient Visit on 12/14/2021   Component Date Value    Source 12/14/2021 THROAT     SARS-CoV-2 12/14/2021 DETECTED*   Admission on 12/10/2021, Discharged on 12/11/2021   Component Date Value    WBC 12/10/2021 3.7*    RBC 12/10/2021 4.35*    Hemoglobin 12/10/2021 13.6     Hematocrit 12/10/2021 40.5*    MCV 12/10/2021 93.1     MCH 12/10/2021 31.3*    MCHC 12/10/2021 33.6     RDW 12/10/2021 13.0     Platelets 23/66/8088 156     MPV 12/10/2021 10.0     Differential Type 12/10/2021 AUTOMATED DIFFERENTIAL     Segs Relative 12/10/2021 44.8     Lymphocytes % 12/10/2021 36.0     Monocytes % 12/10/2021 18.4*    Eosinophils % 12/10/2021 0.0     Basophils % 12/10/2021 0.5     Segs Absolute 12/10/2021 1.7     Lymphocytes Absolute 12/10/2021 1.3     Monocytes Absolute 12/10/2021 0.7     Eosinophils Absolute 12/10/2021 0.0     Basophils Absolute 12/10/2021 0.0     Nucleated RBC % 12/10/2021 0.0     Total Nucleated RBC 12/10/2021 0.0     Total Immature Neutrophil 12/10/2021 0.01     Immature Neutrophil % 12/10/2021 0.3     Sodium 12/10/2021 132*    Potassium 12/10/2021 3.9     Chloride 12/10/2021 97*    CO2 12/10/2021 28     Anion Gap 12/10/2021 7     BUN 12/10/2021 24*    CREATININE 12/10/2021 1.0     Glucose 12/10/2021 100*    Calcium 12/10/2021 9.3     GFR Non- 12/10/2021 >60     GFR  12/10/2021 >60     Troponin T 12/10/2021 <0.010     Lipase 12/10/2021 34            Assessment:       Diagnosis Orders   1. Weight loss             Plan:      See orders    Patient has lost 3 more pounds since the last time. Again, asking if dentist can see him soon. Will ask staff to call office before taking patient to the ER      Return if symptoms worsen or fail to improve.

## 2022-03-08 DIAGNOSIS — R56.9 SEIZURE (HCC): ICD-10-CM

## 2022-03-09 RX ORDER — LEVETIRACETAM 250 MG/1
250 TABLET ORAL 2 TIMES DAILY
Qty: 60 TABLET | Refills: 5 | OUTPATIENT
Start: 2022-03-09

## 2022-03-31 ENCOUNTER — TELEPHONE (OUTPATIENT)
Dept: FAMILY MEDICINE CLINIC | Age: 47
End: 2022-03-31

## 2022-03-31 NOTE — TELEPHONE ENCOUNTER
Chuy Pineda needs order saying Patient needs medication administration 2 times a day    For [address] ph. [phone]

## 2022-04-05 DIAGNOSIS — R56.9 SEIZURE (HCC): ICD-10-CM

## 2022-04-05 RX ORDER — LEVETIRACETAM 250 MG/1
250 TABLET ORAL 2 TIMES DAILY
Qty: 60 TABLET | Refills: 5 | OUTPATIENT
Start: 2022-04-05

## 2022-04-07 ENCOUNTER — TELEPHONE (OUTPATIENT)
Dept: FAMILY MEDICINE CLINIC | Age: 47
End: 2022-04-07

## 2022-04-07 NOTE — TELEPHONE ENCOUNTER
Cheryl Jaleel needs order increased for his Depends currently has 6 packages of 28 in a month, but patient needs 8-10 packages he keeps running out.    [address] [facility] ph. [phone]

## 2022-04-11 DIAGNOSIS — F72 SEVERE INTELLECTUAL DISABILITY: ICD-10-CM

## 2022-04-11 DIAGNOSIS — K59.00 CONSTIPATION, UNSPECIFIED CONSTIPATION TYPE: ICD-10-CM

## 2022-04-11 DIAGNOSIS — R15.9 INCONTINENCE OF FECES, UNSPECIFIED FECAL INCONTINENCE TYPE: ICD-10-CM

## 2022-04-11 RX ORDER — INCONTINENCE PAD,LINER,DISP
EACH MISCELLANEOUS
Qty: 10 EACH | Refills: 11 | Status: SHIPPED | OUTPATIENT
Start: 2022-04-11

## 2022-04-15 ENCOUNTER — TELEPHONE (OUTPATIENT)
Dept: FAMILY MEDICINE CLINIC | Age: 47
End: 2022-04-15

## 2022-04-15 NOTE — TELEPHONE ENCOUNTER
Patient caregiver asking if [facility] take over med rohini Torres from [address] Aleksandra Bhatti asking for verbal orders

## 2022-04-26 ENCOUNTER — OFFICE VISIT (OUTPATIENT)
Dept: FAMILY MEDICINE CLINIC | Age: 47
End: 2022-04-26
Payer: MEDICARE

## 2022-04-26 VITALS
OXYGEN SATURATION: 94 % | DIASTOLIC BLOOD PRESSURE: 60 MMHG | HEART RATE: 65 BPM | SYSTOLIC BLOOD PRESSURE: 100 MMHG | HEIGHT: 60 IN | WEIGHT: 118 LBS | BODY MASS INDEX: 23.16 KG/M2

## 2022-04-26 DIAGNOSIS — R15.9 INCONTINENCE OF FECES, UNSPECIFIED FECAL INCONTINENCE TYPE: ICD-10-CM

## 2022-04-26 DIAGNOSIS — R56.9 SEIZURE (HCC): ICD-10-CM

## 2022-04-26 DIAGNOSIS — F72 SEVERE INTELLECTUAL DISABILITY: ICD-10-CM

## 2022-04-26 DIAGNOSIS — R32 URINARY INCONTINENCE, UNSPECIFIED TYPE: ICD-10-CM

## 2022-04-26 DIAGNOSIS — K59.00 CONSTIPATION, UNSPECIFIED CONSTIPATION TYPE: ICD-10-CM

## 2022-04-26 DIAGNOSIS — K06.9 CHRONIC GUM DISEASE: ICD-10-CM

## 2022-04-26 DIAGNOSIS — L21.0 DANDRUFF: Primary | ICD-10-CM

## 2022-04-26 PROBLEM — Z87.898 HISTORY OF WEIGHT LOSS: Status: RESOLVED | Noted: 2021-11-08 | Resolved: 2022-04-26

## 2022-04-26 PROCEDURE — 99214 OFFICE O/P EST MOD 30 MIN: CPT | Performed by: FAMILY MEDICINE

## 2022-04-26 PROCEDURE — G8427 DOCREV CUR MEDS BY ELIG CLIN: HCPCS | Performed by: FAMILY MEDICINE

## 2022-04-26 PROCEDURE — 1036F TOBACCO NON-USER: CPT | Performed by: FAMILY MEDICINE

## 2022-04-26 PROCEDURE — G8420 CALC BMI NORM PARAMETERS: HCPCS | Performed by: FAMILY MEDICINE

## 2022-04-26 RX ORDER — LEVETIRACETAM 250 MG/1
250 TABLET ORAL DAILY
Qty: 14 TABLET | Refills: 0 | Status: SHIPPED | OUTPATIENT
Start: 2022-04-26 | End: 2022-10-26

## 2022-04-26 NOTE — PROGRESS NOTES
Patient ID: Flaco Rainey 1975    . Chief Complaint   Patient presents with    Other     MRDD    Weight Loss         HPI     Severe intellectual disability: lives in [facility] normally a bit anxious and agitated. Clifford Blanco with caretaker today who is answering all the questions.       Urine and stool incontinence:  Wears adult diapers.  Stool softeners.  Needs refills.  No bowel complaints from caretakers     Dandruff: Has history of dandruff          Rash: per caregiver, has diaper rash. On and off. Use nystatin as needed     Dental problem:   This is not new. Patient continues to have halitosis per caregiver. This has affected his eating in the past.  He is eating fine per caregiver      Weight loss: Has stabilized. Caregiver said that his visiting nurse is asking for the Ensure to be rewritten. Per caregiver, patient has good appetite and they have no concerns about his eating. Constipation: Patient on 2 medications for constipation. Caregiver does not have any concerns about his bowels today. Review of Systems    Patient Active Problem List   Diagnosis    Vitamin D deficiency    Severe intellectual disability    Constipation    Neuropathy    Urinary incontinence    Impulse control disorder in adult    Seborrhea    Charcot's syndrome    Chronic gum disease    Stool incontinence       Past Surgical History:   Procedure Laterality Date    DENTAL SURGERY      dental restoration on 3/2010 and 3/2011   [facility]ENTAL [facility]  9/13/2013    Dental restorations, 2 teeth extractions    DENTAL SURGERY Bilateral [id number]    restorations    DENTAL SURGERY N/A 9/27/2019    DENTAL RESTORATIONSdental extractions x 2 top left, 2 bottom left, 1 bottom right, 1 top right  and 5 in front performed by Coco Patel DDS at [facility] 145       No family history on file.     Current Outpatient Medications on File Prior to Visit   Medication Sig Dispense Refill    Incontinence Supply Disposable (DEPEND UNDERGARMENT EX ABSORB) MISC Patient uses 10 packages of 28 in one month. Patient uses KeySpan Size 10 each 11    nystatin (MYCOSTATIN) 991374 UNIT/GM powder 2 x day for 2 weeks per episode 1 each 11    Disposable Gloves (NITRILE GLOVES LARGE) MISC Use as directed 100 each 11    senna (SENOKOT) 8.6 MG tablet Take 2 tablets by mouth daily 60 tablet 11    busPIRone (BUSPAR) 15 MG tablet Take 15 mg by mouth 2 times daily      citalopram (CELEXA) 40 MG tablet Take 1 tablet by mouth daily 30 tablet 5     No current facility-administered medications on file prior to visit. Objective:         Physical Exam  Vitals and nursing note reviewed. Constitutional:       Appearance: He is well-developed. HENT:      Head: Normocephalic and atraumatic. Comments: dandruff     Mouth/Throat:      Dentition: Abnormal dentition. Comments: hallitosis  Cardiovascular:      Rate and Rhythm: Normal rate and regular rhythm. Heart sounds: Normal heart sounds, S1 normal and S2 normal.   Pulmonary:      Effort: No respiratory distress. Breath sounds: Normal breath sounds. No wheezing or rales. Skin:     General: Skin is warm and dry. Neurological:      Mental Status: He is alert. Mental status is at baseline. Psychiatric:         Speech: Speech is delayed. Behavior: Behavior is slowed. Behavior is cooperative. Cognition and Memory: Cognition is impaired. Memory is impaired. Vitals:    04/26/22 0906   BP: 100/60   Site: Left Upper Arm   Position: Sitting   Cuff Size: Medium Adult   Pulse: 65   SpO2: 94%   Weight: 118 lb (53.5 kg)   Height: 5' (1.524 m)     Body mass index is 23.05 kg/m².      Wt Readings from Last 3 Encounters:   04/26/22 118 lb (53.5 kg)   01/14/22 117 lb 12.8 oz (53.4 kg)   12/10/21 120 lb (54.4 kg)     BP Readings from Last 3 Encounters:   04/26/22 100/60   01/14/22 118/70   12/10/21 96/82          No results found for this visit on 04/26/22. The ASCVD Risk score (Shira Silva, et al., 2013) failed to calculate for the following reasons: The valid total cholesterol range is 130 to 320 mg/dL  Lab Review   No visits with results within 2 Month(s) from this visit. Latest known visit with results is:   Nurse Only on 01/06/2022   Component Date Value    OCCULT BLOOD FECAL 01/06/2022 negative            Assessment:       Diagnosis Orders   1. Dandruff     2. Seizure ([address]  levETIRAcetam (KEPPRA) 250 MG tablet   3. Chronic gum disease     4. Severe intellectual disability     5. Urinary incontinence, unspecified type     6. Incontinence of feces, unspecified fecal incontinence type     7. Constipation, unspecified constipation type             Plan:   Patient unable to verbalize. We will go ahead and stop the Colace since there are no concerns about his bowels. He can continue on the senna    From the very beginning, the plan was to take patient off the [address] in May. We will go ahead and give instructions to start weaning the [address] in May. As of May 1, may cut the Keppra back to 1 tablet/day for 2 weeks. Then afterwards can stop the Keppra. They are to call if he has any more seizures. I think patient's weight has stabilized. We will stop the Ensure since caregiver reports his eating is good. Continue with his medications at this time    Patient's dental disease is well known. He sees dentist regularly. Recheck in 6 months. No follow-ups on file.

## 2022-04-26 NOTE — PATIENT INSTRUCTIONS
PLEASE BRING YOUR MEDICATIONS TO ALL APPOINTMENTS      Please check MY CHART for test results. If you have forgotten your password, please call [phone]. The diagnoses and medications listed in this after visit summary may not be up to date. Please check MY CHART in 28-48 hours for possible corrections. Late cancellation policy: So that we can better accommodate people who are sick, please give our office 24 hour notice for an appointment cancellation. Missed appointments: Your care is very important to us. It is important that you keep your scheduled appointments. Multiple missed appointments will lead to a dismissal from the office. Patients arriving late will be worked into the schedule as time permits, with patients arriving on time taken as scheduled. Late arriving patients are more than welcome to wait or reschedule their appointments. Please allow 5-7 business days for paperwork to be processed. HERE ARE SOME LIFE CHANGING TIPS      1. Take your blood pressure medications at bedtime to reduce your chance of heart attack or stroke  2. Fever in kids:  Give both Tylenol and Ibuprofen at the same time rather than staggering them   3. Follow these tips to reduce childhood obesity: Reduce unnecessary exposure to antibiotics, consume whole milk instead of skim milk, watch public TV instead of regular TV (less exposure to junk food commercials), and reduce traumatic experiences. 4. 1 egg per day is good for your heart  5. Alternate day fasting does promote weight loss. Skipping breakfast increases your risk of obesity  6. Artificially sweetened drinks increase all cause mortality (strokes, body mass index, cardiovascular disease)  7. Kale consumption can reduce onset of dementia  8. Walking at least 8000 steps per day and resistance exercise 2-3 x per week are good for your heart  9. Brushing teeth 3 times per day can decrease chance of getting diabetes  10.  Antibiotic use is associated with a lifetime increased risk of breast cancer and heart disease.

## 2022-04-27 ENCOUNTER — TELEPHONE (OUTPATIENT)
Dept: FAMILY MEDICINE CLINIC | Age: 47
End: 2022-04-27

## 2022-04-27 NOTE — TELEPHONE ENCOUNTER
Ghada Tang from [facility] asking for verbal orders to follow patient for home care

## 2022-05-19 ENCOUNTER — TELEPHONE (OUTPATIENT)
Dept: FAMILY MEDICINE CLINIC | Age: 47
End: 2022-05-19

## 2022-06-29 ENCOUNTER — TELEPHONE (OUTPATIENT)
Dept: FAMILY MEDICINE CLINIC | Age: 47
End: 2022-06-29

## 2022-06-29 DIAGNOSIS — K06.9 CHRONIC GUM DISEASE: Primary | ICD-10-CM

## 2022-06-29 DIAGNOSIS — F72 SEVERE INTELLECTUAL DISABILITY: ICD-10-CM

## 2022-06-29 DIAGNOSIS — R63.4 WEIGHT LOSS: ICD-10-CM

## 2022-06-29 NOTE — TELEPHONE ENCOUNTER
Fox from [facility] called stating that since you stopped the patients ensure, patient has lost 7 pounds, patients weight is 110 now. Isai Robison is concerned about patient losing those 7 pounds. Can we start him back on the ensure.  Please advise     Thank you

## 2022-07-05 ENCOUNTER — TELEPHONE (OUTPATIENT)
Dept: FAMILY MEDICINE CLINIC | Age: 47
End: 2022-07-05

## 2022-07-05 ENCOUNTER — HOSPITAL ENCOUNTER (OUTPATIENT)
Age: 47
Setting detail: SPECIMEN
Discharge: HOME | End: 2022-07-05
Payer: MEDICARE

## 2022-07-05 DIAGNOSIS — Z20.822 EXPOSURE TO CONFIRMED CASE OF COVID-19: Primary | ICD-10-CM

## 2022-07-05 PROCEDURE — U0003 INFECTIOUS AGENT DETECTION BY NUCLEIC ACID (DNA OR RNA); SEVERE ACUTE RESPIRATORY SYNDROME CORONAVIRUS 2 (SARS-COV-2) (CORONAVIRUS DISEASE [COVID-19]), AMPLIFIED PROBE TECHNIQUE, MAKING USE OF HIGH THROUGHPUT TECHNOLOGIES AS DESCRIBED BY CMS-2020-01-R: HCPCS

## 2022-07-05 PROCEDURE — U0005 INFEC AGEN DETEC AMPLI PROBE: HCPCS

## 2022-07-05 NOTE — TELEPHONE ENCOUNTER
Patient has became in contact with roommate/  that is positive for COVID. Patient is not eating and is a little lethargic. Tiffanie Abbasi would like a order for a covid test to be placed in epic. She would like for the patient to be tested at the walk in clinic.  Please advise     Thank you

## 2022-07-06 LAB
SARS-COV-2: NOT DETECTED
SOURCE: NORMAL

## 2022-07-19 ENCOUNTER — TELEPHONE (OUTPATIENT)
Dept: FAMILY MEDICINE CLINIC | Age: 47
End: 2022-07-19

## 2022-07-19 NOTE — TELEPHONE ENCOUNTER
He needs to take it twice a day f per the prescription. We will reevaluate him for his weight gain or weight loss in October at his next appointment.

## 2022-07-19 NOTE — TELEPHONE ENCOUNTER
I am not sure what they mean by parameters regarding his Ensure. Please get details of what is being requested.

## 2022-07-19 NOTE — TELEPHONE ENCOUNTER
Brittney Matias called got a e-mail from a rep from the [facility] wanting to know if there are perimeters for the patients ensure ?

## 2022-09-21 ENCOUNTER — HOSPITAL ENCOUNTER (OUTPATIENT)
Age: 47
Setting detail: SPECIMEN
Discharge: HOME | End: 2022-09-21
Payer: MEDICARE

## 2022-09-21 LAB
ALBUMIN SERPL-MCNC: 4.3 GM/DL (ref 3.4–5)
ALP BLD-CCNC: 59 IU/L (ref 40–128)
ALT SERPL-CCNC: 17 U/L (ref 10–40)
ANION GAP SERPL CALCULATED.3IONS-SCNC: 7 MMOL/L (ref 4–16)
AST SERPL-CCNC: 28 IU/L (ref 15–37)
BASOPHILS ABSOLUTE: 0 K/CU MM
BASOPHILS RELATIVE PERCENT: 0.7 % (ref 0–1)
BILIRUB SERPL-MCNC: 0.2 MG/DL (ref 0–1)
BUN BLDV-MCNC: 20 MG/DL (ref 6–23)
CALCIUM SERPL-MCNC: 9.7 MG/DL (ref 8.3–10.6)
CHLORIDE BLD-SCNC: 105 MMOL/L (ref 99–110)
CHOLESTEROL: 117 MG/DL
CO2: 28 MMOL/L (ref 21–32)
CREAT SERPL-MCNC: 1 MG/DL (ref 0.9–1.3)
DIFFERENTIAL TYPE: ABNORMAL
EOSINOPHILS ABSOLUTE: 0.1 K/CU MM
EOSINOPHILS RELATIVE PERCENT: 2.5 % (ref 0–3)
GFR AFRICAN AMERICAN: >60 ML/MIN/1.73M2
GFR NON-AFRICAN AMERICAN: >60 ML/MIN/1.73M2
GLUCOSE BLD-MCNC: 77 MG/DL (ref 70–99)
HCT VFR BLD CALC: 38.8 % (ref 42–52)
HDLC SERPL-MCNC: 41 MG/DL
HEMOGLOBIN: 13 GM/DL (ref 13.5–18)
IMMATURE NEUTROPHIL %: 0.2 % (ref 0–0.43)
LDL CHOLESTEROL CALCULATED: 66 MG/DL
LDL CHOLESTEROL DIRECT: 66 MG/DL
LYMPHOCYTES ABSOLUTE: 2.3 K/CU MM
LYMPHOCYTES RELATIVE PERCENT: 56.7 % (ref 24–44)
MCH RBC QN AUTO: 31.9 PG (ref 27–31)
MCHC RBC AUTO-ENTMCNC: 33.5 % (ref 32–36)
MCV RBC AUTO: 95.1 FL (ref 78–100)
MONOCYTES ABSOLUTE: 0.3 K/CU MM
MONOCYTES RELATIVE PERCENT: 8.5 % (ref 0–4)
NUCLEATED RBC %: 0 %
PDW BLD-RTO: 12.2 % (ref 11.7–14.9)
PLATELET # BLD: 161 K/CU MM (ref 140–440)
PMV BLD AUTO: 10.2 FL (ref 7.5–11.1)
POTASSIUM SERPL-SCNC: 4.8 MMOL/L (ref 3.5–5.1)
RBC # BLD: 4.08 M/CU MM (ref 4.6–6.2)
SEGMENTED NEUTROPHILS ABSOLUTE COUNT: 1.3 K/CU MM
SEGMENTED NEUTROPHILS RELATIVE PERCENT: 31.4 % (ref 36–66)
SODIUM BLD-SCNC: 140 MMOL/L (ref 135–145)
TOTAL IMMATURE NEUTOROPHIL: 0.01 K/CU MM
TOTAL NUCLEATED RBC: 0 K/CU MM
TOTAL PROTEIN: 6.5 GM/DL (ref 6.4–8.2)
TRIGL SERPL-MCNC: 52 MG/DL
TSH HIGH SENSITIVITY: 2.61 UIU/ML (ref 0.27–4.2)
WBC # BLD: 4 K/CU MM (ref 4–10.5)

## 2022-09-21 PROCEDURE — 80061 LIPID PANEL: CPT

## 2022-09-21 PROCEDURE — 83721 ASSAY OF BLOOD LIPOPROTEIN: CPT

## 2022-09-21 PROCEDURE — 85025 COMPLETE CBC W/AUTO DIFF WBC: CPT

## 2022-09-21 PROCEDURE — 84443 ASSAY THYROID STIM HORMONE: CPT

## 2022-09-21 PROCEDURE — 80053 COMPREHEN METABOLIC PANEL: CPT

## 2022-10-21 ENCOUNTER — IMMUNIZATION (OUTPATIENT)
Dept: FAMILY MEDICINE CLINIC | Age: 47
End: 2022-10-21
Payer: MEDICARE

## 2022-10-21 DIAGNOSIS — Z23 NEED FOR INFLUENZA VACCINATION: Primary | ICD-10-CM

## 2022-10-21 PROCEDURE — G0008 ADMIN INFLUENZA VIRUS VAC: HCPCS | Performed by: FAMILY MEDICINE

## 2022-10-21 PROCEDURE — 90674 CCIIV4 VAC NO PRSV 0.5 ML IM: CPT | Performed by: FAMILY MEDICINE

## 2022-10-21 PROCEDURE — 99211 OFF/OP EST MAY X REQ PHY/QHP: CPT | Performed by: FAMILY MEDICINE

## 2022-10-26 ENCOUNTER — TELEPHONE (OUTPATIENT)
Dept: FAMILY MEDICINE CLINIC | Age: 47
End: 2022-10-26

## 2022-10-26 ENCOUNTER — OFFICE VISIT (OUTPATIENT)
Dept: FAMILY MEDICINE CLINIC | Age: 47
End: 2022-10-26
Payer: MEDICARE

## 2022-10-26 VITALS
BODY MASS INDEX: 22.58 KG/M2 | HEIGHT: 60 IN | HEART RATE: 60 BPM | DIASTOLIC BLOOD PRESSURE: 60 MMHG | SYSTOLIC BLOOD PRESSURE: 112 MMHG | WEIGHT: 115 LBS

## 2022-10-26 DIAGNOSIS — K08.9 POOR DENTITION: ICD-10-CM

## 2022-10-26 DIAGNOSIS — Z23 NEED FOR COVID-19 VACCINE: ICD-10-CM

## 2022-10-26 DIAGNOSIS — R15.9 INCONTINENCE OF FECES, UNSPECIFIED FECAL INCONTINENCE TYPE: ICD-10-CM

## 2022-10-26 DIAGNOSIS — L22 DIAPER RASH: ICD-10-CM

## 2022-10-26 DIAGNOSIS — K59.00 CONSTIPATION, UNSPECIFIED CONSTIPATION TYPE: ICD-10-CM

## 2022-10-26 DIAGNOSIS — F72 SEVERE INTELLECTUAL DISABILITY: Primary | ICD-10-CM

## 2022-10-26 DIAGNOSIS — R32 URINARY INCONTINENCE, UNSPECIFIED TYPE: ICD-10-CM

## 2022-10-26 PROCEDURE — 1036F TOBACCO NON-USER: CPT | Performed by: FAMILY MEDICINE

## 2022-10-26 PROCEDURE — G8420 CALC BMI NORM PARAMETERS: HCPCS | Performed by: FAMILY MEDICINE

## 2022-10-26 PROCEDURE — G8427 DOCREV CUR MEDS BY ELIG CLIN: HCPCS | Performed by: FAMILY MEDICINE

## 2022-10-26 PROCEDURE — 99214 OFFICE O/P EST MOD 30 MIN: CPT | Performed by: FAMILY MEDICINE

## 2022-10-26 PROCEDURE — G8482 FLU IMMUNIZE ORDER/ADMIN: HCPCS | Performed by: FAMILY MEDICINE

## 2022-10-26 RX ORDER — NYSTATIN 100000 [USP'U]/G
POWDER TOPICAL
Qty: 1 EACH | Refills: 11 | Status: SHIPPED | OUTPATIENT
Start: 2022-10-26

## 2022-10-26 RX ORDER — CALCIUM POLYCARBOPHIL 625 MG
1250 TABLET ORAL DAILY
Qty: 60 TABLET | Refills: 11 | Status: SHIPPED | OUTPATIENT
Start: 2022-10-26

## 2022-10-26 ASSESSMENT — PATIENT HEALTH QUESTIONNAIRE - PHQ9: DEPRESSION UNABLE TO ASSESS: PT REFUSES

## 2022-10-26 NOTE — TELEPHONE ENCOUNTER
[facility] called wanting a verbal if Dr Wing Mccullough will still follow orders, re-cert orders.    Call Janae Spain at [facility] [phone]

## 2022-10-26 NOTE — PROGRESS NOTES
Patient ID: Javier Carrillo 1975    . Chief Complaint   Patient presents with    Other     MRDD      Weight Loss           HPI    Severe intellectual disability: lives in [facility]. I Here with caretaker today who is answering all the questions. Urine and stool incontinence:  Wears adult diapers. No bowel complaints from caretakers. Takes Senna     Dandruff: Has history of dandruff        Rash: per caregiver, has diaper rash. On and off. Use nystatin as needed     Dental problem: This is not new. caregiver. His dentist has a 3 year waiting list for him to be seen     Weight loss: Has stabilized. Is on Ensure    Patient Active Problem List   Diagnosis    Vitamin D deficiency    Severe intellectual disability    Constipation    Urinary incontinence    Impulse control disorder in adult    Seborrhea    Charcot's syndrome    Chronic gum disease    Stool incontinence       Past Surgical History:   Procedure Laterality Date    DENTAL SURGERY      dental restoration on 3/2010 and 3/2011    DENTAL SURGERY  9/13/2013    Dental restorations, 2 teeth extractions    DENTAL SURGERY Bilateral [id number]    restorations    DENTAL SURGERY N/A 9/27/2019    DENTAL RESTORATIONSdental extractions x 2 top left, 2 bottom left, 1 bottom right, 1 top right  and 5 in front performed by Akilah Gandara DDS at [facility] 145       No family history on file. Current Outpatient Medications on File Prior to Visit   Medication Sig Dispense Refill    Nutritional Supplements (ENSURE ACTIVE) LIQD Take 1 Can by mouth in the morning and at bedtime 60 each 11    Incontinence Supply Disposable (DEPEND UNDERGARMENT EX ABSORB) MISC Patient uses 10 packages of 28 in one month.   Patient uses Caryl Brand Super Size 10 each 11    busPIRone (BUSPAR) 15 MG tablet Take 15 mg by mouth 2 times daily      citalopram (CELEXA) 40 MG tablet Take 1 tablet by mouth daily 30 tablet 5     No current facility-administered medications on file prior to visit. Objective:         Physical Exam  Vitals and nursing note reviewed. Constitutional:       Appearance: He is well-developed. HENT:      Head: Normocephalic and atraumatic. Mouth/Throat:      Dentition: Abnormal dentition. Comments: No halitosis  Cardiovascular:      Rate and Rhythm: Normal rate and regular rhythm. Heart sounds: Normal heart sounds, S1 normal and S2 normal.   Pulmonary:      Effort: No respiratory distress. Breath sounds: Normal breath sounds. No wheezing or rales. Lymphadenopathy:      Cervical:      Right cervical: No superficial, deep or posterior cervical adenopathy. Left cervical: No superficial, deep or posterior cervical adenopathy. Skin:     General: Skin is warm and dry. Neurological:      Mental Status: He is alert. Mental status is at baseline. Psychiatric:         Mood and Affect: Mood normal.         Speech: Speech is delayed. Behavior: Behavior is slowed. Cognition and Memory: Cognition is impaired. Memory is impaired. Vitals:    10/26/22 0902   BP: 112/60   Site: Left Upper Arm   Position: Sitting   Cuff Size: Medium Adult   Pulse: 60   Weight: 115 lb (52.2 kg)   Height: 5' (1.524 m)     Body mass index is 22.46 kg/m². Wt Readings from Last 3 Encounters:   10/26/22 115 lb (52.2 kg)   04/26/22 118 lb (53.5 kg)   01/14/22 117 lb 12.8 oz (53.4 kg)     BP Readings from Last 3 Encounters:   10/26/22 112/60   04/26/22 100/60   01/14/22 118/70          No results found for this visit on 10/26/22. The ASCVD Risk score (Tesha DK, et al., 2019) failed to calculate for the following reasons:     The valid total cholesterol range is 130 to 320 mg/dL  Lab Review   Hospital Outpatient Visit on 09/21/2022   Component Date Value    WBC 09/21/2022 4.0     RBC 09/21/2022 4.08 (A)     Hemoglobin 09/21/2022 13.0 (A)     Hematocrit 09/21/2022 38.8 (A)     MCV 09/21/2022 95.1     MCH 09/21/2022 31.9 (A)     MCHC 09/21/2022 33.5     RDW 09/21/2022 12.2     Platelets 68/22/0260 161     MPV 09/21/2022 10.2     Differential Type 09/21/2022 AUTOMATED DIFFERENTIAL     Segs Relative 09/21/2022 31.4 (A)     Lymphocytes % 09/21/2022 56.7 (A)     Monocytes % 09/21/2022 8.5 (A)     Eosinophils % 09/21/2022 2.5     Basophils % 09/21/2022 0.7     Segs Absolute 09/21/2022 1.3     Lymphocytes Absolute 09/21/2022 2.3     Monocytes Absolute 09/21/2022 0.3     Eosinophils Absolute 09/21/2022 0.1     Basophils Absolute 09/21/2022 0.0     Nucleated RBC % 09/21/2022 0.0     Total Nucleated RBC 09/21/2022 0.0     Total Immature Neutrophil 09/21/2022 0.01     Immature Neutrophil % 09/21/2022 0.2     Sodium 09/21/2022 140     Potassium 09/21/2022 4.8     Chloride 09/21/2022 105     CO2 09/21/2022 28     BUN 09/21/2022 20     Creatinine 09/21/2022 1.0     Glucose 09/21/2022 77     Calcium 09/21/2022 9.7     Albumin 09/21/2022 4.3     Total Protein 09/21/2022 6.5     Total Bilirubin 09/21/2022 0.2     ALT 09/21/2022 17     AST 09/21/2022 28     Alkaline Phosphatase 09/21/2022 59     GFR Non- 09/21/2022 >60     GFR  09/21/2022 >60     Anion Gap 09/21/2022 7     TSH, High Sensitivity 09/21/2022 2.610     Triglycerides 09/21/2022 52     Cholesterol 09/21/2022 117     HDL 09/21/2022 41     LDL Calculated 09/21/2022 66     LDL Direct 09/21/2022 66            Assessment:       Diagnosis Orders   1. Severe intellectual disability  Disposable Gloves (NITRILE GLOVES LARGE) MISC      2. Need for COVID-19 vaccine        3. Incontinence of feces, unspecified fecal incontinence type  Disposable Gloves (NITRILE GLOVES LARGE) MISC      4. Urinary incontinence, unspecified type  Disposable Gloves (NITRILE GLOVES LARGE) MISC      5. Diaper rash  nystatin (MYCOSTATIN) 678511 UNIT/GM powder      6. Constipation, unspecified constipation type  Calcium Polycarbophil (FIBER) 625 MG TABS      7.  Poor dentition                Plan:      Switch the senna which is a laxative to FiberCon capsules. Patient's weight is stable. BMI is 22    Agree that patient may need to go to a different dentist for care. [facility] is an option for caregiver to consider. 3 years is too long of a wait. Return in about 5 months (around 3/26/2023) for MRDD, underweight.

## 2022-10-31 ENCOUNTER — TELEPHONE (OUTPATIENT)
Dept: FAMILY MEDICINE CLINIC | Age: 47
End: 2022-10-31

## 2022-12-28 ENCOUNTER — TELEPHONE (OUTPATIENT)
Dept: FAMILY MEDICINE CLINIC | Age: 47
End: 2022-12-28

## 2022-12-28 NOTE — TELEPHONE ENCOUNTER
[facility] called stating patient is due for re-certification and asking if you will still follow.

## 2023-03-27 ENCOUNTER — OFFICE VISIT (OUTPATIENT)
Dept: FAMILY MEDICINE CLINIC | Age: 48
End: 2023-03-27
Payer: MEDICARE

## 2023-03-27 VITALS
SYSTOLIC BLOOD PRESSURE: 100 MMHG | OXYGEN SATURATION: 97 % | HEART RATE: 60 BPM | DIASTOLIC BLOOD PRESSURE: 80 MMHG | WEIGHT: 115.8 LBS | BODY MASS INDEX: 22.62 KG/M2

## 2023-03-27 DIAGNOSIS — K59.00 CONSTIPATION, UNSPECIFIED CONSTIPATION TYPE: Primary | ICD-10-CM

## 2023-03-27 DIAGNOSIS — R63.4 WEIGHT LOSS: ICD-10-CM

## 2023-03-27 DIAGNOSIS — K06.9 CHRONIC GUM DISEASE: ICD-10-CM

## 2023-03-27 DIAGNOSIS — Z12.11 SCREEN FOR COLON CANCER: ICD-10-CM

## 2023-03-27 DIAGNOSIS — R15.9 INCONTINENCE OF FECES, UNSPECIFIED FECAL INCONTINENCE TYPE: ICD-10-CM

## 2023-03-27 DIAGNOSIS — F72 SEVERE INTELLECTUAL DISABILITY: ICD-10-CM

## 2023-03-27 PROBLEM — R56.9 SEIZURE (HCC): Status: ACTIVE | Noted: 2023-03-27

## 2023-03-27 PROBLEM — R56.9 SEIZURE (HCC): Status: RESOLVED | Noted: 2023-03-27 | Resolved: 2023-03-27

## 2023-03-27 PROCEDURE — 1036F TOBACCO NON-USER: CPT | Performed by: FAMILY MEDICINE

## 2023-03-27 PROCEDURE — G8482 FLU IMMUNIZE ORDER/ADMIN: HCPCS | Performed by: FAMILY MEDICINE

## 2023-03-27 PROCEDURE — G8420 CALC BMI NORM PARAMETERS: HCPCS | Performed by: FAMILY MEDICINE

## 2023-03-27 PROCEDURE — 99213 OFFICE O/P EST LOW 20 MIN: CPT | Performed by: FAMILY MEDICINE

## 2023-03-27 PROCEDURE — 3017F COLORECTAL CA SCREEN DOC REV: CPT | Performed by: FAMILY MEDICINE

## 2023-03-27 PROCEDURE — G8427 DOCREV CUR MEDS BY ELIG CLIN: HCPCS | Performed by: FAMILY MEDICINE

## 2023-03-27 RX ORDER — INCONTINENCE PAD,LINER,DISP
EACH MISCELLANEOUS
Qty: 10 EACH | Refills: 11 | Status: SHIPPED | OUTPATIENT
Start: 2023-03-27

## 2023-03-27 NOTE — PROGRESS NOTES
Patient ID: Mona Cortez 1975    . Chief Complaint   Patient presents with    Weight Loss         HPI    Severe intellectual disability: lives in [facility]. I Here with caretaker today who is answering all the questions. Urine and stool incontinence:  Wears adult diapers. No bowel complaints from caretakers. Takes Senna     Dandruff: Has history of dandruff        Rash: per caregiver, has diaper rash. On and off. Use nystatin as needed     Dental problem: This is not new. caregiver. His dentist has a 3 year waiting list for him to be seen     Underweight: Caregiver does not know if he  Is still on Ensure         Patient Active Problem List   Diagnosis    Vitamin D deficiency    Severe intellectual disability    Constipation    Urinary incontinence    Seborrhea    Chronic gum disease    Stool incontinence       Past Surgical History:   Procedure Laterality Date    DENTAL SURGERY      dental restoration on 3/2010 and 3/2011    DENTAL SURGERY  9/13/2013    Dental restorations, 2 teeth extractions    DENTAL SURGERY Bilateral [id number]    restorations    DENTAL SURGERY N/A 9/27/2019    DENTAL RESTORATIONSdental extractions x 2 top left, 2 bottom left, 1 bottom right, 1 top right  and 5 in front performed by Austin Lee DDS at [place] [address]       No family history on file. Current Outpatient Medications on File Prior to Visit   Medication Sig Dispense Refill    Disposable Gloves (NITRILE GLOVES LARGE) MISC Use as directed 100 each 11    nystatin (MYCOSTATIN) 824825 UNIT/GM powder 2 x day for 2 weeks per episode 1 each 11    Calcium Polycarbophil (FIBER) 625 MG TABS Take 2 tablets by mouth daily 60 tablet 11    busPIRone (BUSPAR) 15 MG tablet Take 15 mg by mouth 2 times daily      citalopram (CELEXA) 40 MG tablet Take 1 tablet by mouth daily 30 tablet 5     No current facility-administered medications on file prior to visit.                    Objective:         Physical Exam  HENT:

## 2023-03-29 ENCOUNTER — NURSE ONLY (OUTPATIENT)
Dept: FAMILY MEDICINE CLINIC | Age: 48
End: 2023-03-29
Payer: MEDICARE

## 2023-03-29 DIAGNOSIS — Z12.11 COLON CANCER SCREENING: Primary | ICD-10-CM

## 2023-03-29 LAB
CONTROL: NORMAL
HEMOCCULT STL QL: NEGATIVE

## 2023-03-29 PROCEDURE — 82274 ASSAY TEST FOR BLOOD FECAL: CPT | Performed by: FAMILY MEDICINE

## 2023-03-29 PROCEDURE — 99211 OFF/OP EST MAY X REQ PHY/QHP: CPT | Performed by: FAMILY MEDICINE

## 2023-04-20 ENCOUNTER — TELEPHONE (OUTPATIENT)
Dept: FAMILY MEDICINE CLINIC | Age: 48
End: 2023-04-20

## 2023-09-05 ENCOUNTER — OFFICE VISIT (OUTPATIENT)
Dept: FAMILY MEDICINE CLINIC | Age: 48
End: 2023-09-05
Payer: MEDICARE

## 2023-09-05 VITALS
DIASTOLIC BLOOD PRESSURE: 72 MMHG | HEART RATE: 72 BPM | WEIGHT: 115.6 LBS | OXYGEN SATURATION: 95 % | SYSTOLIC BLOOD PRESSURE: 110 MMHG | BODY MASS INDEX: 22.58 KG/M2

## 2023-09-05 DIAGNOSIS — K13.79 BLEEDING IN MOUTH: ICD-10-CM

## 2023-09-05 DIAGNOSIS — K08.9 POOR DENTITION: Primary | ICD-10-CM

## 2023-09-05 PROCEDURE — 3017F COLORECTAL CA SCREEN DOC REV: CPT | Performed by: FAMILY MEDICINE

## 2023-09-05 PROCEDURE — G8427 DOCREV CUR MEDS BY ELIG CLIN: HCPCS | Performed by: FAMILY MEDICINE

## 2023-09-05 PROCEDURE — 99214 OFFICE O/P EST MOD 30 MIN: CPT | Performed by: FAMILY MEDICINE

## 2023-09-05 PROCEDURE — G8420 CALC BMI NORM PARAMETERS: HCPCS | Performed by: FAMILY MEDICINE

## 2023-09-05 PROCEDURE — 1036F TOBACCO NON-USER: CPT | Performed by: FAMILY MEDICINE

## 2023-09-05 RX ORDER — DOXYCYCLINE HYCLATE 100 MG
100 TABLET ORAL 2 TIMES DAILY
Qty: 14 TABLET | Refills: 0 | Status: SHIPPED | OUTPATIENT
Start: 2023-09-05 | End: 2023-09-12

## 2023-09-05 NOTE — PROGRESS NOTES
Patient ID: Luz Elias 1975    Chief Complaint   Patient presents with    Hemoptysis     Noticed blood on shirt, all of the blood came from patients mouth          HPI    Blood on shirt: Patient is MRDD patient. He is brought here by . Caregiver in the home reported that patient had blood on his shirt.  is not aware of how much blood was on the shirt or blood was bright in color or dark in color. Patient has been eating okay and has not been vomiting. His behavior is the same as always. Patient does have a history of very poor dentition. He has been waiting to get into the oral maxillary surgeon. They have not been able to get him in in a timely manner.  with here today is asking for him to be referred to [facility] for oral evaluation. Patient Active Problem List   Diagnosis    Vitamin D deficiency    Severe intellectual disability    Constipation    Urinary incontinence    Seborrhea    Chronic gum disease    Stool incontinence       Past Surgical History:   Procedure Laterality Date    DENTAL SURGERY      dental restoration on 3/2010 and 3/2011    DENTAL SURGERY  9/13/2013    Dental restorations, 2 teeth extractions    DENTAL SURGERY Bilateral [id number]    restorations    DENTAL SURGERY N/A 9/27/2019    DENTAL RESTORATIONSdental extractions x 2 top left, 2 bottom left, 1 bottom right, 1 top right  and 5 in front performed by Magalys Stubbs DDS at [address] [place] [address]       No family history on file. Current Outpatient Medications on File Prior to Visit   Medication Sig Dispense Refill    Incontinence Supply Disposable (DEPEND UNDERGARMENT EX ABSORB) MISC Patient uses 10 packages of 28 in one month.   Patient uses [employer] Size 10 each 11    Nutritional Supplements (ENSURE ACTIVE) LIQD Take 1 Can by mouth in the morning and at bedtime 60 each 11    Disposable Gloves (NITRILE GLOVES LARGE) MISC Use as directed 100 each 11    nystatin

## 2023-09-08 ENCOUNTER — IMMUNIZATION (OUTPATIENT)
Dept: FAMILY MEDICINE CLINIC | Age: 48
End: 2023-09-08
Payer: MEDICARE

## 2023-09-08 ENCOUNTER — TELEPHONE (OUTPATIENT)
Dept: FAMILY MEDICINE CLINIC | Age: 48
End: 2023-09-08

## 2023-09-08 DIAGNOSIS — Z23 NEED FOR INFLUENZA VACCINATION: Primary | ICD-10-CM

## 2023-09-08 PROCEDURE — 90674 CCIIV4 VAC NO PRSV 0.5 ML IM: CPT | Performed by: FAMILY MEDICINE

## 2023-09-08 PROCEDURE — G0008 ADMIN INFLUENZA VIRUS VAC: HCPCS | Performed by: FAMILY MEDICINE

## 2023-09-08 NOTE — TELEPHONE ENCOUNTER
[facility] cannot accept referral due to availability    Needs a new referral for dentistry

## 2023-09-11 DIAGNOSIS — K59.00 CONSTIPATION, UNSPECIFIED CONSTIPATION TYPE: ICD-10-CM

## 2023-09-11 RX ORDER — CALCIUM POLYCARBOPHIL 625 MG/1
2 TABLET, FILM COATED ORAL DAILY
Qty: 62 TABLET | Refills: 11 | OUTPATIENT
Start: 2023-09-11

## 2023-09-27 ENCOUNTER — HOSPITAL ENCOUNTER (OUTPATIENT)
Age: 48
Setting detail: SPECIMEN
Discharge: HOME | End: 2023-09-27
Payer: MEDICARE

## 2023-09-27 LAB
ALBUMIN SERPL-MCNC: 4 G/DL
ALBUMIN SERPL-MCNC: 4 GM/DL (ref 3.4–5)
ALP BLD-CCNC: 57 IU/L (ref 40–128)
ALP BLD-CCNC: 57 U/L
ALT SERPL-CCNC: 17 U/L
ALT SERPL-CCNC: 17 U/L (ref 10–40)
ANION GAP SERPL CALCULATED.3IONS-SCNC: 12 MMOL/L
ANION GAP SERPL CALCULATED.3IONS-SCNC: 12 MMOL/L (ref 4–16)
AST SERPL-CCNC: 30 IU/L (ref 15–37)
AST SERPL-CCNC: 30 U/L
BILIRUB SERPL-MCNC: 0.3 MG/DL (ref 0.1–1.4)
BILIRUB SERPL-MCNC: 0.3 MG/DL (ref 0–1)
BUN BLDV-MCNC: 16 MG/DL
BUN SERPL-MCNC: 16 MG/DL (ref 6–23)
CALCIUM SERPL-MCNC: 9.9 MG/DL
CALCIUM SERPL-MCNC: 9.9 MG/DL (ref 8.3–10.6)
CHLORIDE BLD-SCNC: 105 MMOL/L
CHLORIDE BLD-SCNC: 105 MMOL/L (ref 99–110)
CHOLEST SERPL-MCNC: 120 MG/DL
CHOLESTEROL, TOTAL: 120 MG/DL
CHOLESTEROL/HDL RATIO: 2.6
CO2: 25 MMOL/L
CO2: 25 MMOL/L (ref 21–32)
CREAT SERPL-MCNC: 0.9 MG/DL
CREAT SERPL-MCNC: 0.9 MG/DL (ref 0.9–1.3)
EGFR: 60
GFR SERPL CREATININE-BSD FRML MDRD: >60 ML/MIN/1.73M2
GLUCOSE BLD-MCNC: 70 MG/DL
GLUCOSE SERPL-MCNC: 70 MG/DL (ref 70–99)
HDLC SERPL-MCNC: 45 MG/DL
HDLC SERPL-MCNC: 45 MG/DL (ref 35–70)
LDL CHOLESTEROL CALCULATED: 65 MG/DL (ref 0–160)
LDLC SERPL CALC-MCNC: 65 MG/DL
NONHDLC SERPL-MCNC: 75 MG/DL
POTASSIUM SERPL-SCNC: 4.3 MMOL/L
POTASSIUM SERPL-SCNC: 4.3 MMOL/L (ref 3.5–5.1)
SODIUM BLD-SCNC: 142 MMOL/L
SODIUM BLD-SCNC: 142 MMOL/L (ref 135–145)
TOTAL PROTEIN: 6.6
TOTAL PROTEIN: 6.6 GM/DL (ref 6.4–8.2)
TRIGL SERPL-MCNC: 49 MG/DL
TRIGL SERPL-MCNC: 49 MG/DL
VLDLC SERPL CALC-MCNC: NORMAL MG/DL

## 2023-09-27 PROCEDURE — 80061 LIPID PANEL: CPT

## 2023-09-27 PROCEDURE — 80053 COMPREHEN METABOLIC PANEL: CPT

## 2023-10-10 DIAGNOSIS — K59.00 CONSTIPATION, UNSPECIFIED CONSTIPATION TYPE: ICD-10-CM

## 2023-10-11 DIAGNOSIS — R15.9 INCONTINENCE OF FECES, UNSPECIFIED FECAL INCONTINENCE TYPE: ICD-10-CM

## 2023-10-11 DIAGNOSIS — R32 URINARY INCONTINENCE, UNSPECIFIED TYPE: ICD-10-CM

## 2023-10-11 DIAGNOSIS — L22 DIAPER RASH: ICD-10-CM

## 2023-10-11 DIAGNOSIS — R63.4 WEIGHT LOSS: ICD-10-CM

## 2023-10-11 DIAGNOSIS — K06.9 CHRONIC GUM DISEASE: ICD-10-CM

## 2023-10-11 DIAGNOSIS — K59.00 CONSTIPATION, UNSPECIFIED CONSTIPATION TYPE: ICD-10-CM

## 2023-10-11 DIAGNOSIS — F72 SEVERE INTELLECTUAL DISABILITY: ICD-10-CM

## 2023-10-11 RX ORDER — CALCIUM POLYCARBOPHIL 625 MG/1
2 TABLET, FILM COATED ORAL DAILY
Qty: 62 TABLET | Refills: 11 | OUTPATIENT
Start: 2023-10-11

## 2023-10-11 RX ORDER — CALCIUM POLYCARBOPHIL 625 MG
1250 TABLET ORAL DAILY
Qty: 60 TABLET | Refills: 0 | Status: SHIPPED | OUTPATIENT
Start: 2023-10-11

## 2023-10-11 RX ORDER — NYSTATIN 100000 [USP'U]/G
POWDER TOPICAL
Qty: 1 EACH | Refills: 0 | Status: SHIPPED | OUTPATIENT
Start: 2023-10-11

## 2023-10-23 ENCOUNTER — TELEPHONE (OUTPATIENT)
Dept: FAMILY MEDICINE CLINIC | Age: 48
End: 2023-10-23

## 2023-10-23 NOTE — TELEPHONE ENCOUNTER
Gregory Edouard from [facility] called asking there verbal orders to continue home care. Patient is up for re certification. Is it okay to continue?

## 2023-10-26 ENCOUNTER — OFFICE VISIT (OUTPATIENT)
Dept: FAMILY MEDICINE CLINIC | Age: 48
End: 2023-10-26
Payer: MEDICARE

## 2023-10-26 VITALS
HEART RATE: 62 BPM | BODY MASS INDEX: 22.85 KG/M2 | SYSTOLIC BLOOD PRESSURE: 118 MMHG | DIASTOLIC BLOOD PRESSURE: 60 MMHG | OXYGEN SATURATION: 96 % | WEIGHT: 117 LBS

## 2023-10-26 DIAGNOSIS — K59.00 CONSTIPATION, UNSPECIFIED CONSTIPATION TYPE: ICD-10-CM

## 2023-10-26 DIAGNOSIS — R32 URINARY INCONTINENCE, UNSPECIFIED TYPE: ICD-10-CM

## 2023-10-26 DIAGNOSIS — H61.23 IMPACTED CERUMEN OF BOTH EARS: Primary | ICD-10-CM

## 2023-10-26 DIAGNOSIS — R15.9 INCONTINENCE OF FECES, UNSPECIFIED FECAL INCONTINENCE TYPE: ICD-10-CM

## 2023-10-26 DIAGNOSIS — F72 SEVERE INTELLECTUAL DISABILITY: ICD-10-CM

## 2023-10-26 PROCEDURE — G8420 CALC BMI NORM PARAMETERS: HCPCS | Performed by: FAMILY MEDICINE

## 2023-10-26 PROCEDURE — G8427 DOCREV CUR MEDS BY ELIG CLIN: HCPCS | Performed by: FAMILY MEDICINE

## 2023-10-26 PROCEDURE — 99214 OFFICE O/P EST MOD 30 MIN: CPT | Performed by: FAMILY MEDICINE

## 2023-10-26 PROCEDURE — 3017F COLORECTAL CA SCREEN DOC REV: CPT | Performed by: FAMILY MEDICINE

## 2023-10-26 PROCEDURE — 1036F TOBACCO NON-USER: CPT | Performed by: FAMILY MEDICINE

## 2023-10-26 PROCEDURE — G8482 FLU IMMUNIZE ORDER/ADMIN: HCPCS | Performed by: FAMILY MEDICINE

## 2023-10-26 RX ORDER — CALCIUM POLYCARBOPHIL 625 MG
1250 TABLET ORAL DAILY
Qty: 60 TABLET | Refills: 11 | Status: SHIPPED | OUTPATIENT
Start: 2023-10-26

## 2023-10-26 RX ORDER — MAGNESIUM CARB/ALUMINUM HYDROX 105-160MG
TABLET,CHEWABLE ORAL
Qty: 355 ML | Refills: 1 | Status: SHIPPED | OUTPATIENT
Start: 2023-10-26

## 2023-10-26 NOTE — PROGRESS NOTES
Patient ID: Livia Del Rio 1975    . Chief Complaint   Patient presents with    6 Month Follow-Up     MRDD     Discuss Medications     Is patient suppose to still take his ensure? Vlad Chairezs boni is buying the ensure, is it discontinued if so needs a letter           HPI    Severe intellectual disability: lives in [facility]. Here with caretaker today who is answering all the questions. Caregiver does not know if he is still getting Ensure     Urine and stool incontinence:  Wears adult diapers. No bowel complaints from caretakers. Takes Senna and fiber       Rash: per caregiver, has diaper rash. On and off. Use nystatin as needed          Hx weight loss:   Caregiver who is here today does not know if he is still to be on Ensure. It's still being bought      Patient Active Problem List   Diagnosis    Vitamin D deficiency    Severe intellectual disability    Constipation    Urinary incontinence    Seborrhea    Chronic gum disease    Stool incontinence       Past Surgical History:   Procedure Laterality Date    DENTAL SURGERY      dental restoration on 3/2010 and 3/2011    DENTAL SURGERY  9/13/2013    Dental restorations, 2 teeth extractions    DENTAL SURGERY Bilateral [id number]    restorations    DENTAL SURGERY N/A 9/27/2019    DENTAL RESTORATIONSdental extractions x 2 top left, 2 bottom left, 1 bottom right, 1 top right  and 5 in front performed by Lalo Copeland DDS at [address] [place] [address]       No family history on file. Current Outpatient Medications on File Prior to Visit   Medication Sig Dispense Refill    nystatin (MYCOSTATIN) 621514 UNIT/GM powder 2 x day for 2 weeks per episode 1 each 0    Incontinence Supply Disposable (DEPEND UNDERGARMENT EX ABSORB) MISC Patient uses 10 packages of 28 in one month.   Patient uses Caryl Brand Super Size 10 each 11    busPIRone (BUSPAR) 15 MG tablet Take 15 mg by mouth 2 times daily      citalopram (CELEXA) 40 MG tablet Take 1 tablet by mouth daily 30 tablet 5     No current

## 2023-10-27 ENCOUNTER — TELEPHONE (OUTPATIENT)
Dept: FAMILY MEDICINE CLINIC | Age: 48
End: 2023-10-27

## 2023-10-27 NOTE — TELEPHONE ENCOUNTER
Will stop the ensure. Letter printed. Will sign off on the last home health care orders, but will cancel for the future.

## 2023-10-27 NOTE — TELEPHONE ENCOUNTER
Will stop home health care. Please call home health and let them know I'll sign off on the paperwork that's here, but giving verbal to cancel it for the future.     Can stop ensure (letter given)

## 2023-10-27 NOTE — TELEPHONE ENCOUNTER
Rosita Lambert from [facility] stated he is MRDD and they go out and do his medication pass. Rosita Lambert stated she will have to call the board ( [employer] [place]) to let them know you are stopping his home health. Rosita Lambert stated she is sure the board ( [employer] [place])  will reach out to you       If you want it stopped Ian Winkler would like for it to be stopped on the 11/1/2023 it will be hard on them to stopped the service today and the weekend. 11/1 will give them time to get things put into plan.  Ian Winkler also stated she is needing that letter stating why you are stopping his home health

## 2023-10-27 NOTE — TELEPHONE ENCOUNTER
LM for Estefanía to return call Dr. Terryl Siemens would like to know \" Does Hi Maldonado think he really needs home health care? I would like to stop it.  \"

## 2023-10-27 NOTE — TELEPHONE ENCOUNTER
Anu Kramer stated that is a tricky question she stated \" I don't think he needs it, but it his home health is about the funding. Home health comes out to pass his medication twice a day. Again I don't think he needs it but again its that is about the funding for [facility] to come in and passes his medication. \"     She also wanted to let you know that the ensure was stopped on 4/26/2022 you stated that his weight was stable and you stopped the ensure

## 2023-10-27 NOTE — TELEPHONE ENCOUNTER
Erma Rodriguez stated she also needs you to write a note stating you have stopped his home health it has to go into his files

## 2023-10-31 ENCOUNTER — TELEPHONE (OUTPATIENT)
Dept: FAMILY MEDICINE CLINIC | Age: 48
End: 2023-10-31

## 2023-10-31 NOTE — TELEPHONE ENCOUNTER
Mayra Abby called from [facility]. She is concerned that Jessica Mckeer still needs continued home care for medication administration and for showers, etc.  Since Oakton Liner is nonverbal, he needs higher acuity of care. He has had homehealth care for a long time--she could not tell me when it started since she just started working there last year. (I Could not find documentation of homehealth care in chart prior to 4/15/22 and this is why I questioned the need for continuation)    Plan: OK to re-start home health care. We may be getting paperwork to re-start. Staff: please let Lani Abbott and homehealth care know OK to restart.

## 2023-10-31 NOTE — TELEPHONE ENCOUNTER
Haley Callaway notified at [facility] to re-start.   Letter faxed to [facility] to re start Home Care    Letter to re start and order to re start home care [facility]

## 2023-11-10 ENCOUNTER — TELEPHONE (OUTPATIENT)
Dept: FAMILY MEDICINE CLINIC | Age: 48
End: 2023-11-10

## 2023-11-10 NOTE — TELEPHONE ENCOUNTER
Virginia called from [facility]. Wanted to know why patient being released. Told her patient is not acutely ill and has very few medications. She asked when can home care be stopped. I told her Monday. She states she will let Kirstie Davidson know from [facility].

## 2023-11-21 ENCOUNTER — NURSE ONLY (OUTPATIENT)
Dept: FAMILY MEDICINE CLINIC | Age: 48
End: 2023-11-21

## 2023-11-21 ENCOUNTER — HOSPITAL ENCOUNTER (OUTPATIENT)
Age: 48
Setting detail: SPECIMEN
Discharge: HOME | End: 2023-11-21
Payer: MEDICARE

## 2023-11-21 DIAGNOSIS — Z20.822 ENCOUNTER FOR SCREENING LABORATORY TESTING FOR COVID-19 VIRUS IN ASYMPTOMATIC PATIENT: Primary | ICD-10-CM

## 2023-11-21 PROCEDURE — 87635 SARS-COV-2 COVID-19 AMP PRB: CPT

## 2023-11-21 NOTE — PATIENT INSTRUCTIONS
Self-testing COVID instructions and collection supplies were given to patient. Patient denies any fever, cough or SOB, or any other COVID symptoms, exposure to anyone with COVID, recent COVID testing, or recent travel internationally. Patient voiced understanding of collecting specimen. COVID screen lab has been ordered. Specimen collection was completed without difficulty and proper identifiers placed on specimen. AVS has been given to patient and patient informed results will given once received. Once you test, you must quarantine at home until you are notified regarding the test results, with only your immediate family members or whoever lives with you. If you must work during your quarantine period, we ask that you continue to practice social distancing, wear a mask that covers your mouth and nose and perform all hand hygiene as recommended by the CDC. If you must go to the grocery, etc. and cannot get someone to do this for you please wear a mask that covers your mouth and nose and perform all hand hygiene as recommended by the CDC. The office will notify you once we receive your results.

## 2023-11-22 LAB
SARS-COV-2 RNA RESP QL NAA+PROBE: NOT DETECTED
SOURCE: NORMAL

## 2023-11-29 ENCOUNTER — TELEPHONE (OUTPATIENT)
Dept: FAMILY MEDICINE CLINIC | Age: 48
End: 2023-11-29

## 2023-11-29 NOTE — TELEPHONE ENCOUNTER
Walt Sarah called this morning wanting to ask Dr Maddy Mcpherson if she would like patient to have an apt to evaluate his weight and a look him over. She said at where he stays they say he is eating food and they have been giving him ensure without physicians order. Patient is losing weight . Does he need apt or if dr Maddy Mcpherson wants to just send ensure to the pharmacy for him to start taking is fine too whatever Dr Maddy Mcpherson prefers is fine . Told her would forward message and call her back.

## 2023-12-01 ENCOUNTER — OFFICE VISIT (OUTPATIENT)
Dept: FAMILY MEDICINE CLINIC | Age: 48
End: 2023-12-01
Payer: MEDICARE

## 2023-12-01 VITALS
DIASTOLIC BLOOD PRESSURE: 72 MMHG | WEIGHT: 114.2 LBS | HEART RATE: 78 BPM | BODY MASS INDEX: 22.42 KG/M2 | HEIGHT: 60 IN | SYSTOLIC BLOOD PRESSURE: 108 MMHG

## 2023-12-01 DIAGNOSIS — R47.01 NONVERBAL: ICD-10-CM

## 2023-12-01 DIAGNOSIS — H61.23 IMPACTED CERUMEN OF BOTH EARS: ICD-10-CM

## 2023-12-01 DIAGNOSIS — F72 SEVERE INTELLECTUAL DISABILITY: Primary | ICD-10-CM

## 2023-12-01 PROCEDURE — G8420 CALC BMI NORM PARAMETERS: HCPCS | Performed by: FAMILY MEDICINE

## 2023-12-01 PROCEDURE — 99213 OFFICE O/P EST LOW 20 MIN: CPT | Performed by: FAMILY MEDICINE

## 2023-12-01 PROCEDURE — G8482 FLU IMMUNIZE ORDER/ADMIN: HCPCS | Performed by: FAMILY MEDICINE

## 2023-12-01 PROCEDURE — 3017F COLORECTAL CA SCREEN DOC REV: CPT | Performed by: FAMILY MEDICINE

## 2023-12-01 PROCEDURE — 1036F TOBACCO NON-USER: CPT | Performed by: FAMILY MEDICINE

## 2023-12-01 PROCEDURE — G8427 DOCREV CUR MEDS BY ELIG CLIN: HCPCS | Performed by: FAMILY MEDICINE

## 2023-12-01 NOTE — PROGRESS NOTES
Patient ID: Vero Shea 1975    Chief Complaint   Patient presents with    Weight Loss         HPI    Weight loss: caregiver with patient. Home staff thinks he has lost 5 lbs and are using his funds to buy Ensure. No reports of change in behavior or bleeding gums. Patient Active Problem List   Diagnosis    Vitamin D deficiency    Severe intellectual disability    Constipation    Urinary incontinence    Seborrhea    Chronic gum disease    Stool incontinence       Past Surgical History:   Procedure Laterality Date    DENTAL SURGERY      dental restoration on 3/2010 and 3/2011    DENTAL SURGERY  9/13/2013    Dental restorations, 2 teeth extractions    DENTAL SURGERY Bilateral [id number]    restorations    DENTAL SURGERY N/A 9/27/2019    DENTAL RESTORATIONSdental extractions x 2 top left, 2 bottom left, 1 bottom right, 1 top right  and 5 in front performed by Hanny Black DDS at [address] [place] [address]       No family history on file. Current Outpatient Medications on File Prior to Visit   Medication Sig Dispense Refill    Disposable Gloves (NITRILE GLOVES LARGE) MISC Use as directed 100 each 11    Calcium Polycarbophil (FIBER) 625 MG TABS Take 2 tablets by mouth daily 60 tablet 11    mineral oil liquid Fill baby dropper with mineral oil and fill both ear canals nightly 355 mL 1    nystatin (MYCOSTATIN) 635487 UNIT/GM powder 2 x day for 2 weeks per episode 1 each 0    Incontinence Supply Disposable (DEPEND UNDERGARMENT EX ABSORB) MISC Patient uses 10 packages of 28 in one month. Patient uses Caryl Brand Super Size 10 each 11    busPIRone (BUSPAR) 15 MG tablet Take 15 mg by mouth 2 times daily      citalopram (CELEXA) 40 MG tablet Take 1 tablet by mouth daily 30 tablet 5     No current facility-administered medications on file prior to visit. Objective:           Physical Exam  Vitals and nursing note reviewed. Constitutional:       Appearance: He is well-developed.    HENT:      Head:

## 2024-03-28 DIAGNOSIS — R15.9 INCONTINENCE OF FECES, UNSPECIFIED FECAL INCONTINENCE TYPE: ICD-10-CM

## 2024-03-28 DIAGNOSIS — F72 SEVERE INTELLECTUAL DISABILITY: ICD-10-CM

## 2024-03-28 DIAGNOSIS — K59.00 CONSTIPATION, UNSPECIFIED CONSTIPATION TYPE: ICD-10-CM

## 2024-03-28 RX ORDER — INCONTINENCE PAD,LINER,DISP
EACH MISCELLANEOUS
Qty: 10 EACH | Refills: 11 | Status: SHIPPED | OUTPATIENT
Start: 2024-03-28

## 2024-04-25 ENCOUNTER — OFFICE VISIT (OUTPATIENT)
Dept: FAMILY MEDICINE CLINIC | Age: 49
End: 2024-04-25
Payer: MEDICARE

## 2024-04-25 VITALS — HEART RATE: 82 BPM | DIASTOLIC BLOOD PRESSURE: 80 MMHG | SYSTOLIC BLOOD PRESSURE: 130 MMHG

## 2024-04-25 DIAGNOSIS — R15.9 INCONTINENCE OF FECES, UNSPECIFIED FECAL INCONTINENCE TYPE: ICD-10-CM

## 2024-04-25 DIAGNOSIS — R45.1 FEELING AGITATED: ICD-10-CM

## 2024-04-25 DIAGNOSIS — H61.23 IMPACTED CERUMEN OF BOTH EARS: ICD-10-CM

## 2024-04-25 DIAGNOSIS — F72 SEVERE INTELLECTUAL DISABILITY: ICD-10-CM

## 2024-04-25 DIAGNOSIS — R47.01 NONVERBAL: ICD-10-CM

## 2024-04-25 DIAGNOSIS — L22 DIAPER RASH: Primary | ICD-10-CM

## 2024-04-25 PROBLEM — Z78.9 LIVES IN GROUP HOME: Status: ACTIVE | Noted: 2024-04-25

## 2024-04-25 PROCEDURE — 1036F TOBACCO NON-USER: CPT | Performed by: FAMILY MEDICINE

## 2024-04-25 PROCEDURE — 99214 OFFICE O/P EST MOD 30 MIN: CPT | Performed by: FAMILY MEDICINE

## 2024-04-25 PROCEDURE — G8427 DOCREV CUR MEDS BY ELIG CLIN: HCPCS | Performed by: FAMILY MEDICINE

## 2024-04-25 PROCEDURE — 3017F COLORECTAL CA SCREEN DOC REV: CPT | Performed by: FAMILY MEDICINE

## 2024-04-25 PROCEDURE — G8420 CALC BMI NORM PARAMETERS: HCPCS | Performed by: FAMILY MEDICINE

## 2024-04-25 RX ORDER — NYSTATIN 100000 [USP'U]/G
POWDER TOPICAL
Qty: 1 EACH | Refills: 11 | Status: SHIPPED | OUTPATIENT
Start: 2024-04-25

## 2024-04-25 RX ORDER — BUSPIRONE HYDROCHLORIDE 10 MG/1
10 TABLET ORAL 2 TIMES DAILY
COMMUNITY
Start: 2024-04-23

## 2024-04-25 RX ORDER — CITALOPRAM HYDROBROMIDE 10 MG/1
10 TABLET ORAL 3 TIMES DAILY
COMMUNITY
Start: 2024-04-23

## 2024-04-25 NOTE — PATIENT INSTRUCTIONS
NEW OFFICE HOURS: M-TH 7AM-5PM      BRING YOUR MEDICATION BOTTLES TO ALL APPOINTMENTS    Check MY CHART for test results.  If you have forgotten your password, call [phone].    The diagnoses and medications listed in this after visit summary may not be up to date.  Check MY CHART in 28-48 hours for corrections.      Late cancellation policy: So that we can better accommodate people who are sick, please give our office 24 hour notice for an appointment cancellation.      Missed appointments: Your care is very important to us.  It is important that you keep your scheduled appointments.   Multiple missed appointments will lead to a dismissal from the office.      Patients arriving late will be worked into the schedule as time permits, with patients arriving on time taken as scheduled. Late arriving patients are more than welcome to wait or reschedule their appointments.    Please allow 5-7 business days for paperwork to be processed.

## 2024-04-25 NOTE — PROGRESS NOTES
Patient ID: Petros Kilpatrick 1975    .  Chief Complaint   Patient presents with    MRDD         HPI    Severe intellectual disability: lives in [place].Here with caretaker today who is answering all the questions.       Urine and stool incontinence:  Wears adult diapers.   No bowel complaints from caretakers.  Takes Senna and fiber       Rash: per caregiver, has diaper rash. On and off.  Use nystatin as needed    Moodiness: patient less cooperative lately.  Has had his psych medications reduced lately.  Caregiver will mention this to his psych provider next time      Patient Active Problem List   Diagnosis    Vitamin D deficiency    Severe intellectual disability    Constipation    Urinary incontinence    Seborrhea    Chronic gum disease    Stool incontinence    Lives in group home    Nonverbal    Diaper rash       Past Surgical History:   Procedure Laterality Date    DENTAL SURGERY      dental restoration on 3/2010 and 3/2011    DENTAL SURGERY  9/13/2013    Dental restorations, 2 teeth extractions    DENTAL SURGERY Bilateral [id number]    restorations    DENTAL SURGERY N/A 9/27/2019    DENTAL RESTORATIONSdental extractions x 2 top left, 2 bottom left, 1 bottom right, 1 top right  and 5 in front performed by Axel Ambriz DDS at [facility]       No family history on file.    Current Outpatient Medications on File Prior to Visit   Medication Sig Dispense Refill    busPIRone (BUSPAR) 10 MG tablet Take 1 tablet by mouth in the morning and 1 tablet in the evening.      citalopram (CELEXA) 10 MG tablet Take 1 tablet by mouth in the morning, at noon, and at bedtime      Disposable Gloves (NITRILE GLOVES LARGE) MISC Use as directed 100 each 11    Calcium Polycarbophil (FIBER) 625 MG TABS Take 2 tablets by mouth daily 60 tablet 11    Incontinence Supply Disposable (DEPEND UNDERGARMENT EX ABSORB) MISC Patient uses 10 packages of 28 in one month.  Patient uses Caryl Brand Super Size 10 each 11     No current

## 2024-07-25 ENCOUNTER — OFFICE VISIT (OUTPATIENT)
Dept: FAMILY MEDICINE CLINIC | Age: 49
End: 2024-07-25
Payer: MEDICARE

## 2024-07-25 VITALS
SYSTOLIC BLOOD PRESSURE: 110 MMHG | BODY MASS INDEX: 20.77 KG/M2 | HEIGHT: 60 IN | HEART RATE: 82 BPM | DIASTOLIC BLOOD PRESSURE: 60 MMHG | WEIGHT: 105.8 LBS

## 2024-07-25 DIAGNOSIS — R63.4 WEIGHT LOSS: Primary | ICD-10-CM

## 2024-07-25 DIAGNOSIS — Z12.11 SCREEN FOR COLON CANCER: ICD-10-CM

## 2024-07-25 DIAGNOSIS — F72 SEVERE INTELLECTUAL DISABILITY: ICD-10-CM

## 2024-07-25 DIAGNOSIS — Z12.5 PROSTATE CANCER SCREENING: ICD-10-CM

## 2024-07-25 PROCEDURE — 1036F TOBACCO NON-USER: CPT | Performed by: FAMILY MEDICINE

## 2024-07-25 PROCEDURE — G8420 CALC BMI NORM PARAMETERS: HCPCS | Performed by: FAMILY MEDICINE

## 2024-07-25 PROCEDURE — G8427 DOCREV CUR MEDS BY ELIG CLIN: HCPCS | Performed by: FAMILY MEDICINE

## 2024-07-25 PROCEDURE — 3017F COLORECTAL CA SCREEN DOC REV: CPT | Performed by: FAMILY MEDICINE

## 2024-07-25 PROCEDURE — 99214 OFFICE O/P EST MOD 30 MIN: CPT | Performed by: FAMILY MEDICINE

## 2024-07-25 NOTE — PROGRESS NOTES
Patient ID: Petros Kilpatrick 1975    .  Chief Complaint   Patient presents with    MRDD         HPI    Severe intellectual disability: lives in [place].Here with caretaker today who is answering all the questions.       Urine and stool incontinence:  Wears adult diapers.   No bowel complaints from caretakers.  Takes Senna and fiber       Rash: per caregiver, has diaper rash. On and off.  Use nystatin as needed       Underweight: no blood in urine or stool.  Eating ok for him.  Acting normally    Patient Active Problem List   Diagnosis    Vitamin D deficiency    Severe intellectual disability    Constipation    Urinary incontinence    Seborrhea    Chronic gum disease    Stool incontinence    Lives in group home    Nonverbal    Diaper rash       Past Surgical History:   Procedure Laterality Date    DENTAL SURGERY      dental restoration on 3/2010 and 3/2011    DENTAL SURGERY  9/13/2013    Dental restorations, 2 teeth extractions    DENTAL SURGERY Bilateral [id number]    restorations    DENTAL SURGERY N/A 9/27/2019    DENTAL RESTORATIONSdental extractions x 2 top left, 2 bottom left, 1 bottom right, 1 top right  and 5 in front performed by Axel Ambriz DDS at [facility]       No family history on file.    Current Outpatient Medications on File Prior to Visit   Medication Sig Dispense Refill    busPIRone (BUSPAR) 10 MG tablet Take 1 tablet by mouth in the morning and 1 tablet in the evening.      citalopram (CELEXA) 10 MG tablet Take 1 tablet by mouth in the morning, at noon, and at bedtime      nystatin (MYCOSTATIN) 219472 UNIT/GM powder 2 x day for 2 weeks per episode 1 each 11    Incontinence Supply Disposable (DEPEND UNDERGARMENT EX ABSORB) MISC Patient uses 10 packages of 28 in one month.  Patient uses Caryl Brand Super Size 10 each 11    Disposable Gloves (NITRILE GLOVES LARGE) MISC Use as directed 100 each 11    Calcium Polycarbophil (FIBER) 625 MG TABS Take 2 tablets by mouth daily 60 tablet 11     No

## 2024-07-25 NOTE — PATIENT INSTRUCTIONS
VOTE TUESDAY NOVEMBER 5, 2024        BRING YOUR MEDICATION BOTTLES TO ALL APPOINTMENTS    Check MY CHART for test results.  If you have forgotten your password, call [phone].  The diagnoses and medications listed in this after visit summary may not be up to date.  Check MY CHART in 28-48 hours for corrections.      Late cancellation policy: So that we can better accommodate people who are sick, please give our office 24 hour notice for an appointment cancellation.    Missed appointments: Your care is very important to us.  It is important that you keep your scheduled appointments.   Multiple missed appointments will lead to a dismissal from the office.    Patients arriving late will be worked into the schedule as time permits, with patients arriving on time taken as scheduled. Late arriving patients are more than welcome to wait or reschedule their appointments.    Please allow 5-7 business days for paperwork to be processed.

## 2024-10-08 DIAGNOSIS — K59.00 CONSTIPATION, UNSPECIFIED CONSTIPATION TYPE: ICD-10-CM

## 2024-10-15 RX ORDER — CALCIUM POLYCARBOPHIL 625 MG/1
2 TABLET, FILM COATED ORAL DAILY
Qty: 60 TABLET | Refills: 0 | Status: SHIPPED | OUTPATIENT
Start: 2024-10-15

## 2024-10-28 ENCOUNTER — OFFICE VISIT (OUTPATIENT)
Dept: FAMILY MEDICINE CLINIC | Age: 49
End: 2024-10-28

## 2024-10-28 VITALS
BODY MASS INDEX: 22.15 KG/M2 | SYSTOLIC BLOOD PRESSURE: 118 MMHG | WEIGHT: 113.4 LBS | DIASTOLIC BLOOD PRESSURE: 80 MMHG | OXYGEN SATURATION: 92 % | HEART RATE: 86 BPM

## 2024-10-28 DIAGNOSIS — K59.00 CONSTIPATION, UNSPECIFIED CONSTIPATION TYPE: ICD-10-CM

## 2024-10-28 DIAGNOSIS — R32 URINARY INCONTINENCE, UNSPECIFIED TYPE: ICD-10-CM

## 2024-10-28 DIAGNOSIS — F72 SEVERE INTELLECTUAL DISABILITY: Primary | ICD-10-CM

## 2024-10-28 DIAGNOSIS — Z23 NEEDS FLU SHOT: ICD-10-CM

## 2024-10-28 DIAGNOSIS — R15.9 INCONTINENCE OF FECES, UNSPECIFIED FECAL INCONTINENCE TYPE: ICD-10-CM

## 2024-10-28 SDOH — ECONOMIC STABILITY: FOOD INSECURITY
WITHIN THE PAST 12 MONTHS, THE FOOD YOU BOUGHT JUST DIDN'T LAST AND YOU DIDN'T HAVE MONEY TO GET MORE.: PATIENT UNABLE TO ANSWER

## 2024-10-28 SDOH — ECONOMIC STABILITY: FOOD INSECURITY
WITHIN THE PAST 12 MONTHS, YOU WORRIED THAT YOUR FOOD WOULD RUN OUT BEFORE YOU GOT MONEY TO BUY MORE.: PATIENT UNABLE TO ANSWER

## 2024-10-28 SDOH — ECONOMIC STABILITY: INCOME INSECURITY
HOW HARD IS IT FOR YOU TO PAY FOR THE VERY BASICS LIKE FOOD, HOUSING, MEDICAL CARE, AND HEATING?: PATIENT UNABLE TO ANSWER

## 2024-10-28 ASSESSMENT — PATIENT HEALTH QUESTIONNAIRE - PHQ9: DEPRESSION UNABLE TO ASSESS: FUNCTIONAL CAPACITY MOTIVATION LIMITS ACCURACY

## 2024-10-28 NOTE — PROGRESS NOTES
Component Value Date/Time     09/27/2023 06:45 AM    K 4.3 09/27/2023 06:45 AM     09/27/2023 06:45 AM    CO2 25 09/27/2023 06:45 AM    BUN 16 09/27/2023 06:45 AM    CREATININE 0.9 09/27/2023 06:45 AM    GLUCOSE 70 09/27/2023 06:45 AM    CALCIUM 9.9 09/27/2023 06:45 AM     Lab Results   Component Value Date/Time    CHOL 120 09/27/2023 06:45 AM    CHOL 120 09/27/2023 12:00 AM    TRIG 49 09/27/2023 06:45 AM    HDL 45 09/27/2023 06:45 AM    LDLDIRECT 66 09/21/2022 07:25 AM     No results found for: \"LABA1C\"       Assessment:       Diagnosis Orders   1. Severe intellectual disability  Disposable Gloves (NITRILE GLOVES LARGE) MISC      2. Needs flu shot  Influenza, FLUCELVAX Trivalent, (age 6 mo+) IM, Preservative Free, 0.5mL      3. Constipation, unspecified constipation type  polycarbophil (FIBER-LAX) 625 MG tablet      4. Incontinence of feces, unspecified fecal incontinence type  Disposable Gloves (NITRILE GLOVES LARGE) MISC      5. Urinary incontinence, unspecified type  Disposable Gloves (NITRILE GLOVES LARGE) MISC              Plan:      Weight is improving which is great.  Continue Ensure    Reminder for colon cancer screening--they have order for fit test already    Patient seems to be doing well.  Continue to check weights quarterly for now.      Return in about 3 months (around 1/28/2025) for MRDD.

## 2024-10-29 RX ORDER — CALCIUM POLYCARBOPHIL 625 MG/1
2 TABLET, FILM COATED ORAL DAILY
Qty: 60 TABLET | Refills: 11 | COMMUNITY
Start: 2024-10-29

## 2024-11-19 ENCOUNTER — TELEPHONE (OUTPATIENT)
Dept: FAMILY MEDICINE CLINIC | Age: 49
End: 2024-11-19

## 2024-11-20 ENCOUNTER — TELEPHONE (OUTPATIENT)
Dept: FAMILY MEDICINE CLINIC | Age: 49
End: 2024-11-20

## 2024-11-20 DIAGNOSIS — K59.00 CONSTIPATION, UNSPECIFIED CONSTIPATION TYPE: ICD-10-CM

## 2024-11-20 RX ORDER — CALCIUM POLYCARBOPHIL 625 MG/1
2 TABLET, FILM COATED ORAL DAILY
Qty: 60 TABLET | Refills: 11 | COMMUNITY
Start: 2024-11-20

## 2025-01-28 ENCOUNTER — OFFICE VISIT (OUTPATIENT)
Dept: FAMILY MEDICINE CLINIC | Age: 50
End: 2025-01-28
Payer: MEDICARE

## 2025-01-28 VITALS
DIASTOLIC BLOOD PRESSURE: 60 MMHG | WEIGHT: 118.8 LBS | BODY MASS INDEX: 23.2 KG/M2 | SYSTOLIC BLOOD PRESSURE: 116 MMHG | OXYGEN SATURATION: 97 % | HEART RATE: 70 BPM

## 2025-01-28 DIAGNOSIS — Z23 NEED FOR SHINGLES VACCINE: ICD-10-CM

## 2025-01-28 DIAGNOSIS — K59.00 CONSTIPATION, UNSPECIFIED CONSTIPATION TYPE: ICD-10-CM

## 2025-01-28 DIAGNOSIS — Z12.11 COLON CANCER SCREENING: ICD-10-CM

## 2025-01-28 DIAGNOSIS — F72 SEVERE INTELLECTUAL DISABILITY: Primary | ICD-10-CM

## 2025-01-28 DIAGNOSIS — Z87.898 HISTORY OF WEIGHT LOSS: ICD-10-CM

## 2025-01-28 DIAGNOSIS — L22 DIAPER RASH: ICD-10-CM

## 2025-01-28 DIAGNOSIS — R15.9 INCONTINENCE OF FECES, UNSPECIFIED FECAL INCONTINENCE TYPE: ICD-10-CM

## 2025-01-28 DIAGNOSIS — Z23 NEED FOR COVID-19 VACCINE: ICD-10-CM

## 2025-01-28 DIAGNOSIS — Z78.9 LIVES IN GROUP HOME: ICD-10-CM

## 2025-01-28 PROCEDURE — G8427 DOCREV CUR MEDS BY ELIG CLIN: HCPCS | Performed by: FAMILY MEDICINE

## 2025-01-28 PROCEDURE — 3017F COLORECTAL CA SCREEN DOC REV: CPT | Performed by: FAMILY MEDICINE

## 2025-01-28 PROCEDURE — 99214 OFFICE O/P EST MOD 30 MIN: CPT | Performed by: FAMILY MEDICINE

## 2025-01-28 RX ORDER — INCONTINENCE PAD,LINER,DISP
EACH MISCELLANEOUS
Qty: 10 EACH | Refills: 11 | Status: SHIPPED | OUTPATIENT
Start: 2025-01-28

## 2025-01-28 RX ORDER — NYSTATIN 100000 [USP'U]/G
POWDER TOPICAL
Qty: 1 EACH | Refills: 11 | Status: SHIPPED | OUTPATIENT
Start: 2025-01-28

## 2025-01-28 SDOH — ECONOMIC STABILITY: FOOD INSECURITY: WITHIN THE PAST 12 MONTHS, THE FOOD YOU BOUGHT JUST DIDN'T LAST AND YOU DIDN'T HAVE MONEY TO GET MORE.: NEVER TRUE

## 2025-01-28 SDOH — ECONOMIC STABILITY: FOOD INSECURITY: WITHIN THE PAST 12 MONTHS, YOU WORRIED THAT YOUR FOOD WOULD RUN OUT BEFORE YOU GOT MONEY TO BUY MORE.: NEVER TRUE

## 2025-01-28 NOTE — PROGRESS NOTES
Patient ID: Petros Kilpatrick 1975    .  Chief Complaint   Patient presents with    MRDD         HPI    Severe intellectual disability: lives in [place].Here with caretaker today who is answering all the questions.  No concerns            History of Present Illness  The patient is a 49-year-old male who presents for evaluation of special needs. He is accompanied by his 2 caretakers.    His caretakers report that he has been experiencing anxiety, which they have been managing with the use of music therapy. They also mention that he has special needs, necessitating their presence to provide his medical history. His appetite remains satisfactory, with no reported issues from the home staff. He continues to exhibit breath-holding behavior but has shown improvement in communication skills. His caretakers are actively encouraging this progress. They are also working on helping him express his personality more, noting that he has been giggling, which they find positive. He is unable to undergo a colonoscopy due to his inability to cooperate with the procedure.    He is incontinent of both urine and stool, necessitating the use of adult diapers. There have been no changes in his bowel habits, and no instances of blood in his stool have been observed.      Underweight: He has been followed for the last year for being underweight.  He does not always cooperate for being weighed.  He is eating okay per staff.  IMMUNIZATIONS  He needs a COVID-19 vaccine and a shingles vaccine.    Patient Active Problem List   Diagnosis    Vitamin D deficiency    Severe intellectual disability    Constipation    Urinary incontinence    Seborrhea    Chronic gum disease    Stool incontinence    Lives in group home    Nonverbal    Diaper rash       Past Surgical History:   Procedure Laterality Date    DENTAL SURGERY      dental restoration on 3/2010 and 3/2011    DENTAL SURGERY  9/13/2013    Dental restorations, 2 teeth extractions

## 2025-04-22 ENCOUNTER — OFFICE VISIT (OUTPATIENT)
Dept: FAMILY MEDICINE CLINIC | Age: 50
End: 2025-04-22
Payer: MEDICARE

## 2025-04-22 VITALS
DIASTOLIC BLOOD PRESSURE: 60 MMHG | HEART RATE: 69 BPM | SYSTOLIC BLOOD PRESSURE: 131 MMHG | BODY MASS INDEX: 23.05 KG/M2 | WEIGHT: 117.4 LBS | HEIGHT: 60 IN | OXYGEN SATURATION: 98 %

## 2025-04-22 DIAGNOSIS — Z23 NEED FOR PNEUMOCOCCAL VACCINE: ICD-10-CM

## 2025-04-22 DIAGNOSIS — F72 SEVERE INTELLECTUAL DISABILITY: Primary | ICD-10-CM

## 2025-04-22 DIAGNOSIS — R06.89 BREATH HOLDING EPISODES: ICD-10-CM

## 2025-04-22 DIAGNOSIS — Z23 NEED FOR SHINGLES VACCINE: ICD-10-CM

## 2025-04-22 DIAGNOSIS — Z78.9 LIVES IN GROUP HOME: ICD-10-CM

## 2025-04-22 DIAGNOSIS — L75.0 BODY ODOR: ICD-10-CM

## 2025-04-22 DIAGNOSIS — Z23 NEED FOR COVID-19 VACCINE: ICD-10-CM

## 2025-04-22 PROCEDURE — G8427 DOCREV CUR MEDS BY ELIG CLIN: HCPCS | Performed by: FAMILY MEDICINE

## 2025-04-22 PROCEDURE — G0009 ADMIN PNEUMOCOCCAL VACCINE: HCPCS | Performed by: FAMILY MEDICINE

## 2025-04-22 PROCEDURE — 3017F COLORECTAL CA SCREEN DOC REV: CPT | Performed by: FAMILY MEDICINE

## 2025-04-22 PROCEDURE — 99214 OFFICE O/P EST MOD 30 MIN: CPT | Performed by: FAMILY MEDICINE

## 2025-04-22 PROCEDURE — 90677 PCV20 VACCINE IM: CPT | Performed by: FAMILY MEDICINE

## 2025-04-22 ASSESSMENT — PATIENT HEALTH QUESTIONNAIRE - PHQ9: DEPRESSION UNABLE TO ASSESS: FUNCTIONAL CAPACITY MOTIVATION LIMITS ACCURACY

## 2025-04-22 NOTE — PROGRESS NOTES
Patient ID: Petros Kilpatrick 1975    .  Chief Complaint   Patient presents with    Other     Severe intellectual disability    Weight Loss         Weight Loss      History of Present Illness  The patient is a 50-year-old male presenting for follow-up of MRD-D and weight management, accompanied by his caretaker.    Breath-holding behavior  - Persists    Weight management  - Weight stable at 117 lbs (previously 118 lbs, 113 lbs in 10/2024)  - Dietary intake satisfactory  - Uses nystatin powder for 2 weeks per episode  - Uses fiber supplements  - Uses incontinence supplies  - Uses gloves    Dental health  - Lost a few teeth a couple of years ago, improving well-being  - Dental visits every 2 years  - Difficulty finding a dentist accepting his insurance  - Takes Ensure 3 times daily, considering discontinuation due to stable weight    Colorectal health  - Negative Cologuard in 03/2023  - Consulted for colonoscopy in 08/2024, not performed  - Positive FIT results  - Coordinating care with Dr. Bobby  - No recent Cologuard result    Supplemental information: None        Patient Active Problem List   Diagnosis    Vitamin D deficiency    Severe intellectual disability    Constipation    Urinary incontinence    Seborrhea    Chronic gum disease    Stool incontinence    Lives in group home    Nonverbal    Diaper rash    Breath holding episodes         Current Outpatient Medications on File Prior to Visit   Medication Sig Dispense Refill    nystatin (MYCOSTATIN) 545847 UNIT/GM powder 2 x day for 2 weeks per episode 1 each 11    Incontinence Supply Disposable (DEPEND UNDERGARMENT EX ABSORB) MISC Patient uses 10 packages of 28 in one month.  Patient uses Caryl Brand Super Size 10 each 11    polycarbophil (FIBER-LAX) 625 MG tablet Take 2 tablets by mouth daily 60 tablet 11    Disposable Gloves (NITRILE GLOVES LARGE) MISC Use as directed 100 each 11    busPIRone (BUSPAR) 10 MG tablet Take 1 tablet by mouth in the morning and

## 2025-05-22 ENCOUNTER — TELEPHONE (OUTPATIENT)
Dept: FAMILY MEDICINE CLINIC | Age: 50
End: 2025-05-22

## 2025-05-22 ENCOUNTER — CLINICAL SUPPORT (OUTPATIENT)
Dept: FAMILY MEDICINE CLINIC | Age: 50
End: 2025-05-22

## 2025-05-22 VITALS — WEIGHT: 116.2 LBS | BODY MASS INDEX: 22.69 KG/M2

## 2025-05-22 DIAGNOSIS — Z87.898 HISTORY OF WEIGHT LOSS: Primary | ICD-10-CM

## 2025-05-22 NOTE — PROGRESS NOTES
Patient was seen in the office today for a nurse visit, patient was here for a weight check. Patient weight was 116.2 lb.

## 2025-05-22 NOTE — TELEPHONE ENCOUNTER
Patient was seen in the office today for weight check with a nurse. Patient weight was 116.2 lb. Please advise

## 2025-06-30 ENCOUNTER — COMMUNITY OUTREACH (OUTPATIENT)
Dept: FAMILY MEDICINE CLINIC | Age: 50
End: 2025-06-30

## 2025-07-22 ENCOUNTER — OFFICE VISIT (OUTPATIENT)
Dept: FAMILY MEDICINE CLINIC | Age: 50
End: 2025-07-22
Payer: MEDICARE

## 2025-07-22 VITALS
OXYGEN SATURATION: 98 % | DIASTOLIC BLOOD PRESSURE: 60 MMHG | BODY MASS INDEX: 22.65 KG/M2 | SYSTOLIC BLOOD PRESSURE: 112 MMHG | WEIGHT: 116 LBS | HEART RATE: 60 BPM

## 2025-07-22 DIAGNOSIS — Z23 NEED FOR SHINGLES VACCINE: ICD-10-CM

## 2025-07-22 DIAGNOSIS — R06.89 BREATH HOLDING EPISODES: ICD-10-CM

## 2025-07-22 DIAGNOSIS — Z78.9 LIVES IN GROUP HOME: ICD-10-CM

## 2025-07-22 DIAGNOSIS — R15.9 INCONTINENCE OF FECES, UNSPECIFIED FECAL INCONTINENCE TYPE: ICD-10-CM

## 2025-07-22 DIAGNOSIS — F72 SEVERE INTELLECTUAL DISABILITY: Primary | ICD-10-CM

## 2025-07-22 PROCEDURE — G8427 DOCREV CUR MEDS BY ELIG CLIN: HCPCS | Performed by: FAMILY MEDICINE

## 2025-07-22 PROCEDURE — G8420 CALC BMI NORM PARAMETERS: HCPCS | Performed by: FAMILY MEDICINE

## 2025-07-22 PROCEDURE — 99213 OFFICE O/P EST LOW 20 MIN: CPT | Performed by: FAMILY MEDICINE

## 2025-07-22 PROCEDURE — 3017F COLORECTAL CA SCREEN DOC REV: CPT | Performed by: FAMILY MEDICINE

## 2025-07-22 PROCEDURE — 1036F TOBACCO NON-USER: CPT | Performed by: FAMILY MEDICINE

## 2025-07-22 ASSESSMENT — PATIENT HEALTH QUESTIONNAIRE - PHQ9: DEPRESSION UNABLE TO ASSESS: PT REFUSES

## 2025-07-22 NOTE — PROGRESS NOTES
Patient ID: Petros Kilpatrick 1975    .  Chief Complaint   Patient presents with    MRDD    underweight         HPI    History of Present Illness  The patient is a 50-year-old male with MRDD and underweight concerns, accompanied by his guardian.    Weight Concerns  - Weight stable at 116 lbs without shoes  - Appetite good    Dental Issues  - Halitosis persists but improved  - Several teeth extracted  - Further dental procedures delayed due to COVID-19 restrictions    Eating Habits  - Eating habits normal  - No discomfort or pain  - Exhibits breath-holding habit    Incontinence  - Incontinent of urine and stool  - Holds bowel movements for extended periods, followed by multiple bowel movements in a day  - On fiber supplement    Patient Active Problem List   Diagnosis    Vitamin D deficiency    Severe intellectual disability    Constipation    Urinary incontinence    Seborrhea    Chronic gum disease    Stool incontinence    Lives in group home    Nonverbal    Diaper rash    Breath holding episodes       Past Surgical History:   Procedure Laterality Date    DENTAL SURGERY      dental restoration on 3/2010 and 3/2011    DENTAL SURGERY  9/13/2013    Dental restorations, 2 teeth extractions    DENTAL SURGERY Bilateral [id number]    restorations    DENTAL SURGERY N/A 9/27/2019    DENTAL RESTORATIONSdental extractions x 2 top left, 2 bottom left, 1 bottom right, 1 top right  and 5 in front performed by Axel Ambriz DDS at [facility]       No family history on file.    Current Outpatient Medications on File Prior to Visit   Medication Sig Dispense Refill    nystatin (MYCOSTATIN) [id number] UNIT/GM powder 2 x day for 2 weeks per episode 1 each 11    Incontinence Supply Disposable (DEPEND UNDERGARMENT EX ABSORB) MISC Patient uses 10 packages of 28 in one month.  Patient uses Caryl Brand Super Size 10 each 11    polycarbophil (FIBER-LAX) 625 MG tablet Take 2 tablets by mouth daily 60 tablet 11    Disposable Gloves (NITRILE

## (undated) DEVICE — SOLUTION IV IRRIG WATER 1000ML POUR BRL 2F7114

## (undated) DEVICE — PACK,BASIC,IX: Brand: MEDLINE

## (undated) DEVICE — POSITIONER,HEAD,RING CUSHION,9IN,32CS: Brand: MEDLINE

## (undated) DEVICE — GAUZE,SPONGE,4"X4",16PLY,XRAY,STRL,LF: Brand: MEDLINE

## (undated) DEVICE — BANDAGE,SELF ADHRNT,COFLEX,4"X5YD,STRL: Brand: COLABEL

## (undated) DEVICE — ANESTHESIA CIRCUIT ADULT-LF: Brand: MEDLINE INDUSTRIES, INC.

## (undated) DEVICE — LINER SUCT CANSTR 1500CC SEMI RIG W/ POR HYDROPHOBIC SHUT

## (undated) DEVICE — TUBING, SUCTION, 9/32" X 10', STRAIGHT: Brand: MEDLINE

## (undated) DEVICE — YANKAUER,FLEXIBLE HANDLE,REGLR CAPACITY: Brand: MEDLINE INDUSTRIES, INC.

## (undated) DEVICE — TOWEL,OR,DSP,ST,BLUE,STD,6/PK,12PK/CS: Brand: MEDLINE

## (undated) DEVICE — GLOVE RAD SZ 8.5 L11.85IN THK9MIL IVRY POLYCHLOROPRENE

## (undated) DEVICE — STERILE POLYISOPRENE POWDER-FREE SURGICAL GLOVES: Brand: PROTEXIS